# Patient Record
Sex: FEMALE | Race: WHITE | Employment: OTHER | URBAN - METROPOLITAN AREA
[De-identification: names, ages, dates, MRNs, and addresses within clinical notes are randomized per-mention and may not be internally consistent; named-entity substitution may affect disease eponyms.]

---

## 2017-05-04 ENCOUNTER — OFFICE VISIT (OUTPATIENT)
Dept: FAMILY MEDICINE CLINIC | Age: 77
End: 2017-05-04

## 2017-05-04 ENCOUNTER — TELEPHONE (OUTPATIENT)
Dept: FAMILY MEDICINE CLINIC | Age: 77
End: 2017-05-04

## 2017-05-04 VITALS
TEMPERATURE: 98.2 F | HEART RATE: 72 BPM | HEIGHT: 64 IN | SYSTOLIC BLOOD PRESSURE: 110 MMHG | BODY MASS INDEX: 20.32 KG/M2 | OXYGEN SATURATION: 98 % | DIASTOLIC BLOOD PRESSURE: 60 MMHG | RESPIRATION RATE: 16 BRPM | WEIGHT: 119 LBS

## 2017-05-04 DIAGNOSIS — E78.2 MIXED HYPERLIPIDEMIA: Primary | ICD-10-CM

## 2017-05-04 DIAGNOSIS — G47.00 INSOMNIA, UNSPECIFIED TYPE: ICD-10-CM

## 2017-05-04 PROCEDURE — 99213 OFFICE O/P EST LOW 20 MIN: CPT | Performed by: FAMILY MEDICINE

## 2017-05-04 RX ORDER — ZOLPIDEM TARTRATE 10 MG/1
10 TABLET ORAL NIGHTLY PRN
Qty: 30 TABLET | Refills: 2 | Status: SHIPPED | OUTPATIENT
Start: 2017-05-04 | End: 2017-10-02 | Stop reason: SDUPTHER

## 2017-05-04 RX ORDER — MONTELUKAST SODIUM 4 MG/1
1 TABLET, CHEWABLE ORAL DAILY
Qty: 90 TABLET | Refills: 1 | Status: SHIPPED | OUTPATIENT
Start: 2017-05-04 | End: 2017-12-05 | Stop reason: SDUPTHER

## 2017-05-04 ASSESSMENT — ENCOUNTER SYMPTOMS
GASTROINTESTINAL NEGATIVE: 1
RESPIRATORY NEGATIVE: 1

## 2017-05-09 ENCOUNTER — TELEPHONE (OUTPATIENT)
Dept: FAMILY MEDICINE CLINIC | Age: 77
End: 2017-05-09

## 2017-05-10 ENCOUNTER — TELEPHONE (OUTPATIENT)
Dept: FAMILY MEDICINE CLINIC | Age: 77
End: 2017-05-10

## 2017-05-19 ENCOUNTER — OFFICE VISIT (OUTPATIENT)
Dept: FAMILY MEDICINE CLINIC | Age: 77
End: 2017-05-19

## 2017-05-19 VITALS
HEIGHT: 64 IN | RESPIRATION RATE: 16 BRPM | OXYGEN SATURATION: 98 % | WEIGHT: 119 LBS | DIASTOLIC BLOOD PRESSURE: 60 MMHG | TEMPERATURE: 98 F | SYSTOLIC BLOOD PRESSURE: 114 MMHG | HEART RATE: 68 BPM | BODY MASS INDEX: 20.32 KG/M2

## 2017-05-19 DIAGNOSIS — Z00.00 ANNUAL PHYSICAL EXAM: Primary | ICD-10-CM

## 2017-05-19 LAB
ALBUMIN SERPL-MCNC: 4.2 G/DL (ref 3.4–5)
ALP BLD-CCNC: 84 U/L (ref 40–129)
ALT SERPL-CCNC: 15 U/L (ref 10–40)
ANION GAP SERPL CALCULATED.3IONS-SCNC: 13 MMOL/L (ref 3–16)
AST SERPL-CCNC: 19 U/L (ref 15–37)
BASOPHILS ABSOLUTE: 0 K/UL (ref 0–0.2)
BASOPHILS RELATIVE PERCENT: 0.7 %
BILIRUB SERPL-MCNC: 0.6 MG/DL (ref 0–1)
BILIRUBIN DIRECT: <0.2 MG/DL (ref 0–0.3)
BILIRUBIN, INDIRECT: NORMAL MG/DL (ref 0–1)
BILIRUBIN, POC: NORMAL
BLOOD URINE, POC: NORMAL
BUN BLDV-MCNC: 12 MG/DL (ref 7–20)
CALCIUM SERPL-MCNC: 9.2 MG/DL (ref 8.3–10.6)
CHLORIDE BLD-SCNC: 100 MMOL/L (ref 99–110)
CHOLESTEROL, TOTAL: 202 MG/DL (ref 0–199)
CLARITY, POC: CLEAR
CO2: 27 MMOL/L (ref 21–32)
COLOR, POC: YELLOW
CREAT SERPL-MCNC: 0.6 MG/DL (ref 0.6–1.2)
EOSINOPHILS ABSOLUTE: 0.1 K/UL (ref 0–0.6)
EOSINOPHILS RELATIVE PERCENT: 1.3 %
GFR AFRICAN AMERICAN: >60
GFR NON-AFRICAN AMERICAN: >60
GLUCOSE BLD-MCNC: 87 MG/DL (ref 70–99)
GLUCOSE URINE, POC: NORMAL
HCT VFR BLD CALC: 45.3 % (ref 36–48)
HDLC SERPL-MCNC: 98 MG/DL (ref 40–60)
HEMOGLOBIN: 14.4 G/DL (ref 12–16)
KETONES, POC: NORMAL
LDL CHOLESTEROL CALCULATED: 90 MG/DL
LEUKOCYTE EST, POC: NORMAL
LYMPHOCYTES ABSOLUTE: 1.7 K/UL (ref 1–5.1)
LYMPHOCYTES RELATIVE PERCENT: 32.5 %
MCH RBC QN AUTO: 31.7 PG (ref 26–34)
MCHC RBC AUTO-ENTMCNC: 31.9 G/DL (ref 31–36)
MCV RBC AUTO: 99.2 FL (ref 80–100)
MONOCYTES ABSOLUTE: 0.5 K/UL (ref 0–1.3)
MONOCYTES RELATIVE PERCENT: 10.2 %
NEUTROPHILS ABSOLUTE: 2.8 K/UL (ref 1.7–7.7)
NEUTROPHILS RELATIVE PERCENT: 55.3 %
NITRITE, POC: NORMAL
PDW BLD-RTO: 13.8 % (ref 12.4–15.4)
PH, POC: 7.5
PLATELET # BLD: 251 K/UL (ref 135–450)
PMV BLD AUTO: 8.3 FL (ref 5–10.5)
POTASSIUM SERPL-SCNC: 4.8 MMOL/L (ref 3.5–5.1)
PROTEIN, POC: NORMAL
RBC # BLD: 4.56 M/UL (ref 4–5.2)
SODIUM BLD-SCNC: 140 MMOL/L (ref 136–145)
SPECIFIC GRAVITY, POC: 1.01
TOTAL PROTEIN: 6.6 G/DL (ref 6.4–8.2)
TRIGL SERPL-MCNC: 69 MG/DL (ref 0–150)
TSH SERPL DL<=0.05 MIU/L-ACNC: 1.45 UIU/ML (ref 0.27–4.2)
UROBILINOGEN, POC: 0.2
VLDLC SERPL CALC-MCNC: 14 MG/DL
WBC # BLD: 5.1 K/UL (ref 4–11)

## 2017-05-19 PROCEDURE — 81002 URINALYSIS NONAUTO W/O SCOPE: CPT | Performed by: FAMILY MEDICINE

## 2017-05-19 PROCEDURE — 99397 PER PM REEVAL EST PAT 65+ YR: CPT | Performed by: FAMILY MEDICINE

## 2017-05-19 PROCEDURE — 93000 ELECTROCARDIOGRAM COMPLETE: CPT | Performed by: FAMILY MEDICINE

## 2017-05-19 PROCEDURE — 36415 COLL VENOUS BLD VENIPUNCTURE: CPT | Performed by: FAMILY MEDICINE

## 2017-05-19 ASSESSMENT — PATIENT HEALTH QUESTIONNAIRE - PHQ9
2. FEELING DOWN, DEPRESSED OR HOPELESS: 0
SUM OF ALL RESPONSES TO PHQ9 QUESTIONS 1 & 2: 0
1. LITTLE INTEREST OR PLEASURE IN DOING THINGS: 0
SUM OF ALL RESPONSES TO PHQ QUESTIONS 1-9: 0

## 2017-06-12 ENCOUNTER — OFFICE VISIT (OUTPATIENT)
Dept: FAMILY MEDICINE CLINIC | Age: 77
End: 2017-06-12

## 2017-06-12 VITALS
TEMPERATURE: 98.2 F | RESPIRATION RATE: 16 BRPM | SYSTOLIC BLOOD PRESSURE: 122 MMHG | DIASTOLIC BLOOD PRESSURE: 64 MMHG | WEIGHT: 121 LBS | OXYGEN SATURATION: 98 % | HEIGHT: 64 IN | HEART RATE: 76 BPM | BODY MASS INDEX: 20.66 KG/M2

## 2017-06-12 DIAGNOSIS — H69.82 EUSTACHIAN TUBE DYSFUNCTION, LEFT: Primary | ICD-10-CM

## 2017-06-12 PROCEDURE — 99213 OFFICE O/P EST LOW 20 MIN: CPT | Performed by: FAMILY MEDICINE

## 2017-06-22 ENCOUNTER — HOSPITAL ENCOUNTER (OUTPATIENT)
Dept: MAMMOGRAPHY | Age: 77
Discharge: HOME | End: 2017-06-22
Attending: OBSTETRICS & GYNECOLOGY | Admitting: OBSTETRICS & GYNECOLOGY

## 2017-06-22 DIAGNOSIS — Z12.31 VISIT FOR SCREENING MAMMOGRAM: ICD-10-CM

## 2017-08-08 ENCOUNTER — TELEPHONE (OUTPATIENT)
Dept: FAMILY MEDICINE CLINIC | Age: 77
End: 2017-08-08

## 2017-08-08 DIAGNOSIS — M85.9 DISORDER OF BONE DENSITY AND STRUCTURE, UNSPECIFIED: ICD-10-CM

## 2017-08-08 DIAGNOSIS — M85.80 OSTEOPENIA, UNSPECIFIED LOCATION: ICD-10-CM

## 2017-08-14 ENCOUNTER — HOSPITAL ENCOUNTER (OUTPATIENT)
Dept: MAMMOGRAPHY | Age: 77
Discharge: HOME | End: 2017-08-14
Attending: FAMILY MEDICINE | Admitting: FAMILY MEDICINE

## 2017-08-14 DIAGNOSIS — M85.9 DISORDER OF BONE DENSITY AND STRUCTURE, UNSPECIFIED: ICD-10-CM

## 2017-08-14 PROBLEM — M81.0 AGE-RELATED OSTEOPOROSIS WITHOUT CURRENT PATHOLOGICAL FRACTURE: Status: ACTIVE | Noted: 2017-08-14

## 2017-08-14 RX ORDER — ALENDRONATE SODIUM 70 MG/1
70 TABLET ORAL
Qty: 4 TABLET | Refills: 11 | Status: SHIPPED | OUTPATIENT
Start: 2017-08-14 | End: 2017-09-11 | Stop reason: ALTCHOICE

## 2017-09-11 ENCOUNTER — OFFICE VISIT (OUTPATIENT)
Dept: FAMILY MEDICINE CLINIC | Age: 77
End: 2017-09-11

## 2017-09-11 VITALS
HEIGHT: 63 IN | OXYGEN SATURATION: 98 % | BODY MASS INDEX: 21.44 KG/M2 | SYSTOLIC BLOOD PRESSURE: 104 MMHG | WEIGHT: 121 LBS | HEART RATE: 76 BPM | DIASTOLIC BLOOD PRESSURE: 60 MMHG

## 2017-09-11 DIAGNOSIS — F51.01 PRIMARY INSOMNIA: ICD-10-CM

## 2017-09-11 DIAGNOSIS — M81.0 AGE-RELATED OSTEOPOROSIS WITHOUT CURRENT PATHOLOGICAL FRACTURE: Primary | ICD-10-CM

## 2017-09-11 DIAGNOSIS — K52.9 CHRONIC DIARRHEA: ICD-10-CM

## 2017-09-11 PROCEDURE — 99214 OFFICE O/P EST MOD 30 MIN: CPT | Performed by: FAMILY MEDICINE

## 2017-09-11 RX ORDER — TRAZODONE HYDROCHLORIDE 100 MG/1
100 TABLET ORAL NIGHTLY
Qty: 30 TABLET | Refills: 1 | Status: SHIPPED | OUTPATIENT
Start: 2017-09-11 | End: 2017-10-02 | Stop reason: SINTOL

## 2017-09-11 ASSESSMENT — ENCOUNTER SYMPTOMS: DIARRHEA: 1

## 2017-09-12 ENCOUNTER — HOSPITAL ENCOUNTER (OUTPATIENT)
Dept: ONCOLOGY | Age: 77
Discharge: HOME | End: 2017-09-12
Attending: OBSTETRICS & GYNECOLOGY | Admitting: OBSTETRICS & GYNECOLOGY

## 2017-09-12 VITALS
BODY MASS INDEX: 21.44 KG/M2 | WEIGHT: 121 LBS | SYSTOLIC BLOOD PRESSURE: 120 MMHG | HEIGHT: 63 IN | TEMPERATURE: 98 F | HEART RATE: 76 BPM | DIASTOLIC BLOOD PRESSURE: 70 MMHG | RESPIRATION RATE: 18 BRPM

## 2017-09-12 ASSESSMENT — PAIN - FUNCTIONAL ASSESSMENT: PAIN_FUNCTIONAL_ASSESSMENT: 0-10

## 2017-09-13 ENCOUNTER — TELEPHONE (OUTPATIENT)
Dept: FAMILY MEDICINE CLINIC | Age: 77
End: 2017-09-13

## 2017-10-02 ENCOUNTER — OFFICE VISIT (OUTPATIENT)
Dept: FAMILY MEDICINE CLINIC | Age: 77
End: 2017-10-02

## 2017-10-02 VITALS
SYSTOLIC BLOOD PRESSURE: 92 MMHG | DIASTOLIC BLOOD PRESSURE: 68 MMHG | TEMPERATURE: 97.7 F | WEIGHT: 121.2 LBS | HEART RATE: 82 BPM | HEIGHT: 63 IN | BODY MASS INDEX: 21.48 KG/M2

## 2017-10-02 DIAGNOSIS — F51.01 PRIMARY INSOMNIA: Primary | ICD-10-CM

## 2017-10-02 PROCEDURE — 99213 OFFICE O/P EST LOW 20 MIN: CPT | Performed by: FAMILY MEDICINE

## 2017-10-02 RX ORDER — ZOLPIDEM TARTRATE 10 MG/1
5 TABLET ORAL NIGHTLY PRN
Qty: 30 TABLET | Refills: 0 | Status: SHIPPED | OUTPATIENT
Start: 2017-10-02 | End: 2018-04-17 | Stop reason: SDUPTHER

## 2017-10-02 NOTE — PROGRESS NOTES
Ankit Ambriz is a 78 y.o. female    Chief Complaint   Patient presents with    Insomnia       HPI:    HPI    Insomnia. Chronic issue. She has more trouble staying asleep. The Ambien has helped. Without the Ambien, she sleeps about 5 hours. With the Ambien, she sleeps about 6 hours per sleep appears much better. She is here for a f/u. She tried the Trazodone but it made very dizzy and she passed out. She went to the ER and had a normal workup including normal EKG, chest x-ray and CMP/CBC. No more further episodes. She typically takes about 5 of the Ambien per month. She is one of [place] needs a 6 month refill. ROS:    Review of Systems   Neurological: Negative for dizziness. Psychiatric/Behavioral: The patient has insomnia. BP 92/68 (Site: Left Arm, Position: Sitting, Cuff Size: Large Adult)  Pulse 82  Temp 97.7 °F (36.5 °C) (Oral)   Ht 5' 3\" (1.6 m)  Wt 121 lb 3.2 oz (55 kg)  Breastfeeding? No  BMI 21.47 kg/m2    Physical Exam:    Physical Exam   Constitutional: No distress. Skin: She is not diaphoretic. Psychiatric: She has a normal mood and affect. Current Outpatient Prescriptions   Medication Sig Dispense Refill    zolpidem (AMBIEN) 10 MG tablet Take 0.5 tablets by mouth nightly as needed (prn) 30 tablet 0    colestipol (COLESTID) 1 G tablet Take 1 tablet by mouth daily 90 tablet 1    clotrimazole (LOTRIMIN) 1 % cream Apply topically      BESIVANCE 0.6 % SUSP INSTILL 1 DROP INTO LEFT EYE THREE TIMES A DAY START 3 DAYS PRIOR TO SURGERY  0    loperamide (IMODIUM) 2 MG capsule Take 2 mg by mouth 4 times daily as needed for Diarrhea      PREMARIN vaginal cream        No current facility-administered medications for this visit. Assessment:    1. Primary insomnia        Plan:     1. Primary insomnia  Contract signed today. 30 Ambien tablets will last her 6 months.   - zolpidem (AMBIEN) 10 MG tablet;  Take 0.5 tablets by mouth nightly as needed (prn)  Dispense: 30 tablet;

## 2017-10-02 NOTE — LETTER
assessments recommended by my provider. · I will actively participate in any program designed to improve function, including social, physical, psychological and daily or work activities. 2. I will not use illegal or street drugs or another person's prescription. If I have an addiction problem with drugs or alcohol and my provider asks me to enter a program to address this issue, I agree to follow through. Such programs may include:  · 12-Step program and securing a sponsor  · Individual counseling   · Inpatient or outpatient treatment  · Other:_____________________________________________________________________________________________________________________________________________    If in treatment, I will request that a copy of the programs initial evaluation and treatment recommendations be sent to this provider and will not expect refills until that is received. I will also request written monthly updates be sent to this provider to verify my continuing treatment. 3. I will consent to drug screening upon my providers request to assure I am only taking the prescribed drugs, described in this MEDICATION AGREEMENT. I understand that a drug screen is a laboratory test in which a sample of my urine, blood or saliva is checked to see what drugs I have been taking. 4. I agree that I will treat the providers and staff at this office with respect at all times. I will keep all of my scheduled appointments, but if I need to cancel my appointment, I will do so a minimum of 24 hours before it is scheduled. 5. I understand that this provider may stop prescribing the medications listed if:  · I do not show any improvement in pain, or my activity has not improved. · I develop rapid tolerance or loss of improvement, as described in my treatment plan. · I develop significant side effects from the medication.   · My behavior is inconsistent with the responsibilities outlined above,

## 2017-10-02 NOTE — MR AVS SNAPSHOT
After Visit Summary             Hannah Carmona   10/2/2017 9:30 AM   Office Visit    Description:  Female : 1940   Provider:  Bina Mckeon DO   Department:  [address] [place] [address] and Future Appointments         Below is a list of your follow-up and future appointments. This may not be a complete list as you may have made appointments directly with providers that we are not aware of or your providers may have made some for you. Please call your providers to confirm appointments. It is important to keep your appointments. Please bring your current insurance card, photo ID, co-pay, and all medication bottles to your appointment. If self-pay, payment is expected at the time of service. Your To-Do List     Future Appointments Provider Department Dept Phone    2018 9:30 AM Bina Mckeon DO [facility] [phone]    Please arrive 15 minutes prior to appointment, bring photo ID and insurance card. Follow-Up    Return in about 6 months (around 2018) for Insomnia. Information from Your Visit        Department     Name Address Phone Fax    [address]      You Were Seen for:         Comments    Primary insomnia   [[id number]]         Vital Signs     Blood Pressure Pulse Temperature Height Weight Breastfeeding? 92/68 (Site: Left Arm, Position: Sitting, Cuff Size: Large Adult) 82 97.7 °F (36.5 °C) (Oral) 5' 3\" (1.6 m) 121 lb 3.2 oz (55 kg) No    Body Mass Index Smoking Status                21.47 kg/m2 Never Smoker             Today's Medication Changes          These changes are accurate as of: 10/2/17 10:09 AM.  If you have any questions, ask your nurse or doctor.                CHANGE how you take these medications           zolpidem 10 MG tablet   Commonly known as:  AMBIEN Problem List as of 10/2/2017  Date Reviewed: 8/8/2017                Age-related osteoporosis without current pathological fracture    Insomnia    HLD (hyperlipidemia)      Your Goals as of 10/2/2017 at 10:09 AM                 Weight    Weight < 200 lb (90.719 kg)     Notes    I will increase my physical activity level. Immunizations as of 10/2/2017     Name Date    Influenza Vaccine, unspecified formulation 10/2/2015    Influenza Virus Vaccine 10/11/2013    Influenza, High Dose 9/21/2017, 10/14/2014    Pneumococcal 13-valent Conjugate (Alexandra Linda) 4/17/2015    Tdap (Boostrix, Adacel) 9/15/2014      Preventive Care        Date Due    Pneumococcal Vaccines (two) for all adults aged 72 and over (2 of 2 - PPSV23) 4/17/2016    Cholesterol Screening 5/19/2022    Colonoscopy 5/20/2024    Tetanus Combination Vaccine (2 - Td) 9/15/2024            [employer] Signup           Our records indicate that you have an active [employer] account. You can view your After Visit Summary by going to [email or web address] and logging in with your [employer] username and password. If you don't have a [employer] username and password but a parent or guardian has access to your record, the parent or guardian should login with their own [employer] username and password and access your record to view the After Visit Summary. Additional Information  If you have questions, please contact the physician practice where you receive care. Remember, [employer] is NOT to be used for urgent needs. For medical emergencies, dial 911. For questions regarding your [employer] account call [phone]. If you have a clinical question, please call your doctor's office.

## 2017-10-05 LAB
6-ACETYLMORPHINE: NOT DETECTED
7-AMINOCLONAZEPAM: NOT DETECTED
ALPHA-OH-ALPRAZOLAM: NOT DETECTED
ALPRAZOLAM: NOT DETECTED
AMPHETAMINE: NOT DETECTED
BARBITURATES: NOT DETECTED
BENZOYLECGONINE: NOT DETECTED
BUPRENORPHINE: NOT DETECTED
CARISOPRODOL: NOT DETECTED
CLONAZEPAM: NOT DETECTED
CODEINE: NOT DETECTED
CREATININE URINE: 181.9 MG/DL (ref 20–400)
DIAZEPAM: NOT DETECTED
DRUGS EXPECTED: NORMAL
EER PAIN MGT DRUG PANEL, HIGH RES/EMIT U: NORMAL
ETHYL GLUCURONIDE: PRESENT
FENTANYL: NOT DETECTED
HYDROCODONE: NOT DETECTED
HYDROMORPHONE: NOT DETECTED
LORAZEPAM: NOT DETECTED
MARIJUANA METABOLITE: NOT DETECTED
MDA: NOT DETECTED
MDEA: NOT DETECTED
MDMA URINE: NOT DETECTED
MEPERIDINE: NOT DETECTED
METHADONE: NOT DETECTED
METHAMPHETAMINE: NOT DETECTED
METHYLPHENIDATE: NOT DETECTED
MIDAZOLAM: NOT DETECTED
MORPHINE: NOT DETECTED
NORBUPRENORPHINE, FREE: NOT DETECTED
NORDIAZEPAM: NOT DETECTED
NORFENTANYL: NOT DETECTED
NORHYDROCODONE, URINE: NOT DETECTED
NOROXYCODONE: NOT DETECTED
NOROXYMORPHONE, URINE: NOT DETECTED
OXAZEPAM: NOT DETECTED
OXYCODONE: NOT DETECTED
OXYMORPHONE: NOT DETECTED
PAIN MANAGEMENT DRUG PANEL: NORMAL
PAIN MANAGEMENT DRUG PANEL: NORMAL
PCP: NOT DETECTED
PHENTERMINE: NOT DETECTED
PROPOXYPHENE: NOT DETECTED
TAPENTADOL, URINE: NOT DETECTED
TAPENTADOL-O-SULFATE, URINE: NOT DETECTED
TEMAZEPAM: NOT DETECTED
TRAMADOL: NOT DETECTED
ZOLPIDEM: NOT DETECTED

## 2017-12-05 DIAGNOSIS — E78.2 MIXED HYPERLIPIDEMIA: ICD-10-CM

## 2017-12-05 RX ORDER — MONTELUKAST SODIUM 4 MG/1
1 TABLET, CHEWABLE ORAL DAILY
Qty: 90 TABLET | Refills: 1 | Status: SHIPPED | OUTPATIENT
Start: 2017-12-05 | End: 2018-07-24 | Stop reason: SDUPTHER

## 2018-04-17 ENCOUNTER — OFFICE VISIT (OUTPATIENT)
Dept: INTERNAL MEDICINE CLINIC | Age: 78
End: 2018-04-17

## 2018-04-17 VITALS
HEART RATE: 86 BPM | BODY MASS INDEX: 20.38 KG/M2 | DIASTOLIC BLOOD PRESSURE: 69 MMHG | WEIGHT: 115 LBS | SYSTOLIC BLOOD PRESSURE: 133 MMHG | OXYGEN SATURATION: 99 % | TEMPERATURE: 97.9 F | HEIGHT: 63 IN | RESPIRATION RATE: 14 BRPM

## 2018-04-17 DIAGNOSIS — Z00.00 MEDICARE ANNUAL WELLNESS VISIT, INITIAL: Primary | ICD-10-CM

## 2018-04-17 DIAGNOSIS — E78.00 HYPERCHOLESTEROLEMIA: ICD-10-CM

## 2018-04-17 DIAGNOSIS — Z23 NEED FOR PROPHYLACTIC VACCINATION AND INOCULATION AGAINST VARICELLA: ICD-10-CM

## 2018-04-17 DIAGNOSIS — F51.01 PRIMARY INSOMNIA: ICD-10-CM

## 2018-04-17 DIAGNOSIS — M81.0 AGE-RELATED OSTEOPOROSIS WITHOUT CURRENT PATHOLOGICAL FRACTURE: ICD-10-CM

## 2018-04-17 DIAGNOSIS — Z98.890 DIARRHEA FOLLOWING GASTROINTESTINAL SURGERY: ICD-10-CM

## 2018-04-17 DIAGNOSIS — R19.7 DIARRHEA FOLLOWING GASTROINTESTINAL SURGERY: ICD-10-CM

## 2018-04-17 PROCEDURE — G0438 PPPS, INITIAL VISIT: HCPCS | Performed by: INTERNAL MEDICINE

## 2018-04-17 PROCEDURE — 1123F ACP DISCUSS/DSCN MKR DOCD: CPT | Performed by: INTERNAL MEDICINE

## 2018-04-17 PROCEDURE — 99215 OFFICE O/P EST HI 40 MIN: CPT | Performed by: INTERNAL MEDICINE

## 2018-04-17 PROCEDURE — 4040F PNEUMOC VAC/ADMIN/RCVD: CPT | Performed by: INTERNAL MEDICINE

## 2018-04-17 PROCEDURE — 1036F TOBACCO NON-USER: CPT | Performed by: INTERNAL MEDICINE

## 2018-04-17 PROCEDURE — 1090F PRES/ABSN URINE INCON ASSESS: CPT | Performed by: INTERNAL MEDICINE

## 2018-04-17 PROCEDURE — 3017F COLORECTAL CA SCREEN DOC REV: CPT | Performed by: INTERNAL MEDICINE

## 2018-04-17 PROCEDURE — G8427 DOCREV CUR MEDS BY ELIG CLIN: HCPCS | Performed by: INTERNAL MEDICINE

## 2018-04-17 PROCEDURE — G8399 PT W/DXA RESULTS DOCUMENT: HCPCS | Performed by: INTERNAL MEDICINE

## 2018-04-17 PROCEDURE — G8420 CALC BMI NORM PARAMETERS: HCPCS | Performed by: INTERNAL MEDICINE

## 2018-04-17 RX ORDER — CALCIUM CARBONATE 500(1250)
600 TABLET ORAL DAILY
COMMUNITY

## 2018-04-17 RX ORDER — ZOLPIDEM TARTRATE 10 MG/1
5 TABLET ORAL NIGHTLY PRN
Qty: 30 TABLET | Refills: 0 | Status: SHIPPED | OUTPATIENT
Start: 2018-04-17 | End: 2018-10-02 | Stop reason: SDUPTHER

## 2018-04-17 ASSESSMENT — LIFESTYLE VARIABLES
HOW OFTEN DO YOU HAVE A DRINK CONTAINING ALCOHOL: 4
HOW OFTEN DURING THE LAST YEAR HAVE YOU FOUND THAT YOU WERE NOT ABLE TO STOP DRINKING ONCE YOU HAD STARTED: 0
AUDIT TOTAL SCORE: 4
HOW OFTEN DURING THE LAST YEAR HAVE YOU NEEDED AN ALCOHOLIC DRINK FIRST THING IN THE MORNING TO GET YOURSELF GOING AFTER A NIGHT OF HEAVY DRINKING: 0
HAVE YOU OR SOMEONE ELSE BEEN INJURED AS A RESULT OF YOUR DRINKING: 0
HOW OFTEN DURING THE LAST YEAR HAVE YOU BEEN UNABLE TO REMEMBER WHAT HAPPENED THE NIGHT BEFORE BECAUSE YOU HAD BEEN DRINKING: 0
HOW OFTEN DURING THE LAST YEAR HAVE YOU HAD A FEELING OF GUILT OR REMORSE AFTER DRINKING: 0
HOW OFTEN DO YOU HAVE SIX OR MORE DRINKS ON ONE OCCASION: 0
HOW OFTEN DURING THE LAST YEAR HAVE YOU FAILED TO DO WHAT WAS NORMALLY EXPECTED FROM YOU BECAUSE OF DRINKING: 0
HAS A RELATIVE, FRIEND, DOCTOR, OR ANOTHER HEALTH PROFESSIONAL EXPRESSED CONCERN ABOUT YOUR DRINKING OR SUGGESTED YOU CUT DOWN: 0
HOW MANY STANDARD DRINKS CONTAINING ALCOHOL DO YOU HAVE ON A TYPICAL DAY: 0
AUDIT-C TOTAL SCORE: 4

## 2018-04-17 ASSESSMENT — PATIENT HEALTH QUESTIONNAIRE - PHQ9: SUM OF ALL RESPONSES TO PHQ QUESTIONS 1-9: 2

## 2018-04-17 ASSESSMENT — ANXIETY QUESTIONNAIRES: GAD7 TOTAL SCORE: 1

## 2018-04-19 ENCOUNTER — HOSPITAL ENCOUNTER (OUTPATIENT)
Dept: GENERAL RADIOLOGY | Age: 78
Discharge: HOME | End: 2018-04-19
Attending: INTERNAL MEDICINE | Admitting: INTERNAL MEDICINE

## 2018-04-19 DIAGNOSIS — Z98.890 DIARRHEA FOLLOWING GASTROINTESTINAL SURGERY: ICD-10-CM

## 2018-04-19 DIAGNOSIS — R19.7 DIARRHEA FOLLOWING GASTROINTESTINAL SURGERY: ICD-10-CM

## 2018-04-19 DIAGNOSIS — M81.0 AGE-RELATED OSTEOPOROSIS WITHOUT CURRENT PATHOLOGICAL FRACTURE: ICD-10-CM

## 2018-04-19 DIAGNOSIS — E78.00 HYPERCHOLESTEROLEMIA: ICD-10-CM

## 2018-04-19 LAB
A/G RATIO: 1.4 (ref 1.1–2.2)
ALBUMIN SERPL-MCNC: 4.2 G/DL (ref 3.4–5)
ALP BLD-CCNC: 66 U/L (ref 40–129)
ALT SERPL-CCNC: 11 U/L (ref 10–40)
ANION GAP SERPL CALCULATED.3IONS-SCNC: 16 MMOL/L (ref 3–16)
AST SERPL-CCNC: 17 U/L (ref 15–37)
BASOPHILS ABSOLUTE: 0 K/UL (ref 0–0.2)
BASOPHILS RELATIVE PERCENT: 0.8 %
BILIRUB SERPL-MCNC: 0.7 MG/DL (ref 0–1)
BUN BLDV-MCNC: 10 MG/DL (ref 7–20)
CALCIUM SERPL-MCNC: 9.2 MG/DL (ref 8.3–10.6)
CHLORIDE BLD-SCNC: 101 MMOL/L (ref 99–110)
CHOLESTEROL, TOTAL: 190 MG/DL (ref 0–199)
CO2: 25 MMOL/L (ref 21–32)
CREAT SERPL-MCNC: 0.6 MG/DL (ref 0.6–1.2)
EOSINOPHILS ABSOLUTE: 0.1 K/UL (ref 0–0.6)
EOSINOPHILS RELATIVE PERCENT: 1.7 %
GFR AFRICAN AMERICAN: >60
GFR NON-AFRICAN AMERICAN: >60
GLOBULIN: 3.1 G/DL
GLUCOSE BLD-MCNC: 86 MG/DL (ref 70–99)
HCT VFR BLD CALC: 45.5 % (ref 36–48)
HDLC SERPL-MCNC: 89 MG/DL (ref 40–60)
HEMOGLOBIN: 15.2 G/DL (ref 12–16)
LDL CHOLESTEROL CALCULATED: 84 MG/DL
LYMPHOCYTES ABSOLUTE: 1.6 K/UL (ref 1–5.1)
LYMPHOCYTES RELATIVE PERCENT: 33.2 %
MCH RBC QN AUTO: 32.7 PG (ref 26–34)
MCHC RBC AUTO-ENTMCNC: 33.5 G/DL (ref 31–36)
MCV RBC AUTO: 97.5 FL (ref 80–100)
MONOCYTES ABSOLUTE: 0.5 K/UL (ref 0–1.3)
MONOCYTES RELATIVE PERCENT: 9.6 %
NEUTROPHILS ABSOLUTE: 2.6 K/UL (ref 1.7–7.7)
NEUTROPHILS RELATIVE PERCENT: 54.7 %
PDW BLD-RTO: 13.3 % (ref 12.4–15.4)
PLATELET # BLD: 236 K/UL (ref 135–450)
PMV BLD AUTO: 8.3 FL (ref 5–10.5)
POTASSIUM SERPL-SCNC: 4.5 MMOL/L (ref 3.5–5.1)
RBC # BLD: 4.67 M/UL (ref 4–5.2)
SODIUM BLD-SCNC: 142 MMOL/L (ref 136–145)
TOTAL PROTEIN: 7.3 G/DL (ref 6.4–8.2)
TRIGL SERPL-MCNC: 83 MG/DL (ref 0–150)
VLDLC SERPL CALC-MCNC: 17 MG/DL
WBC # BLD: 4.8 K/UL (ref 4–11)

## 2018-04-20 ENCOUNTER — HOSPITAL ENCOUNTER (OUTPATIENT)
Dept: ONCOLOGY | Age: 78
Discharge: HOME | End: 2018-04-20
Attending: OBSTETRICS & GYNECOLOGY | Admitting: OBSTETRICS & GYNECOLOGY

## 2018-04-20 VITALS
HEART RATE: 89 BPM | WEIGHT: 112 LBS | SYSTOLIC BLOOD PRESSURE: 108 MMHG | TEMPERATURE: 96 F | BODY MASS INDEX: 19.84 KG/M2 | DIASTOLIC BLOOD PRESSURE: 61 MMHG | RESPIRATION RATE: 16 BRPM | HEIGHT: 63 IN

## 2018-04-20 ASSESSMENT — PAIN - FUNCTIONAL ASSESSMENT: PAIN_FUNCTIONAL_ASSESSMENT: 0-10

## 2018-04-23 ENCOUNTER — TELEPHONE (OUTPATIENT)
Dept: INTERNAL MEDICINE CLINIC | Age: 78
End: 2018-04-23

## 2018-04-24 ENCOUNTER — TELEPHONE (OUTPATIENT)
Dept: INTERNAL MEDICINE CLINIC | Age: 78
End: 2018-04-24

## 2018-06-13 DIAGNOSIS — E78.2 MIXED HYPERLIPIDEMIA: ICD-10-CM

## 2018-06-13 RX ORDER — MONTELUKAST SODIUM 4 MG/1
1 TABLET, CHEWABLE ORAL DAILY
Qty: 90 TABLET | Refills: 1 | OUTPATIENT
Start: 2018-06-13

## 2018-06-18 ENCOUNTER — TELEPHONE (OUTPATIENT)
Dept: INTERNAL MEDICINE CLINIC | Age: 78
End: 2018-06-18

## 2018-06-27 ENCOUNTER — HOSPITAL ENCOUNTER (OUTPATIENT)
Dept: MAMMOGRAPHY | Age: 78
Discharge: HOME | End: 2018-06-27
Attending: PODIATRIST | Admitting: PODIATRIST

## 2018-06-27 DIAGNOSIS — Z12.31 VISIT FOR SCREENING MAMMOGRAM: ICD-10-CM

## 2018-06-28 ENCOUNTER — OFFICE VISIT (OUTPATIENT)
Dept: INTERNAL MEDICINE CLINIC | Age: 78
End: 2018-06-28

## 2018-06-28 VITALS
SYSTOLIC BLOOD PRESSURE: 100 MMHG | DIASTOLIC BLOOD PRESSURE: 68 MMHG | BODY MASS INDEX: 20.3 KG/M2 | HEIGHT: 63 IN | HEART RATE: 86 BPM | OXYGEN SATURATION: 93 % | WEIGHT: 114.6 LBS

## 2018-06-28 DIAGNOSIS — Z98.890 DIARRHEA FOLLOWING GASTROINTESTINAL SURGERY: Primary | ICD-10-CM

## 2018-06-28 DIAGNOSIS — R19.7 DIARRHEA FOLLOWING GASTROINTESTINAL SURGERY: Primary | ICD-10-CM

## 2018-06-28 PROCEDURE — 1123F ACP DISCUSS/DSCN MKR DOCD: CPT | Performed by: INTERNAL MEDICINE

## 2018-06-28 PROCEDURE — 99213 OFFICE O/P EST LOW 20 MIN: CPT | Performed by: INTERNAL MEDICINE

## 2018-06-28 PROCEDURE — 1090F PRES/ABSN URINE INCON ASSESS: CPT | Performed by: INTERNAL MEDICINE

## 2018-06-28 PROCEDURE — 3017F COLORECTAL CA SCREEN DOC REV: CPT | Performed by: INTERNAL MEDICINE

## 2018-06-28 PROCEDURE — 1036F TOBACCO NON-USER: CPT | Performed by: INTERNAL MEDICINE

## 2018-06-28 PROCEDURE — G8420 CALC BMI NORM PARAMETERS: HCPCS | Performed by: INTERNAL MEDICINE

## 2018-06-28 PROCEDURE — 4040F PNEUMOC VAC/ADMIN/RCVD: CPT | Performed by: INTERNAL MEDICINE

## 2018-06-28 PROCEDURE — G8399 PT W/DXA RESULTS DOCUMENT: HCPCS | Performed by: INTERNAL MEDICINE

## 2018-06-28 PROCEDURE — G8427 DOCREV CUR MEDS BY ELIG CLIN: HCPCS | Performed by: INTERNAL MEDICINE

## 2018-07-24 ENCOUNTER — OFFICE VISIT (OUTPATIENT)
Dept: INTERNAL MEDICINE CLINIC | Age: 78
End: 2018-07-24

## 2018-07-24 VITALS
OXYGEN SATURATION: 91 % | HEART RATE: 87 BPM | WEIGHT: 114.4 LBS | DIASTOLIC BLOOD PRESSURE: 76 MMHG | BODY MASS INDEX: 20.27 KG/M2 | HEIGHT: 63 IN | SYSTOLIC BLOOD PRESSURE: 124 MMHG

## 2018-07-24 DIAGNOSIS — H61.23 BILATERAL IMPACTED CERUMEN: ICD-10-CM

## 2018-07-24 DIAGNOSIS — F51.01 PRIMARY INSOMNIA: ICD-10-CM

## 2018-07-24 DIAGNOSIS — R19.7 DIARRHEA FOLLOWING GASTROINTESTINAL SURGERY: Primary | ICD-10-CM

## 2018-07-24 DIAGNOSIS — Z98.890 DIARRHEA FOLLOWING GASTROINTESTINAL SURGERY: Primary | ICD-10-CM

## 2018-07-24 PROBLEM — E78.00 HYPERCHOLESTEROLEMIA: Status: RESOLVED | Noted: 2018-04-17 | Resolved: 2018-07-24

## 2018-07-24 PROCEDURE — 3017F COLORECTAL CA SCREEN DOC REV: CPT | Performed by: INTERNAL MEDICINE

## 2018-07-24 PROCEDURE — 1123F ACP DISCUSS/DSCN MKR DOCD: CPT | Performed by: INTERNAL MEDICINE

## 2018-07-24 PROCEDURE — 1101F PT FALLS ASSESS-DOCD LE1/YR: CPT | Performed by: INTERNAL MEDICINE

## 2018-07-24 PROCEDURE — G8420 CALC BMI NORM PARAMETERS: HCPCS | Performed by: INTERNAL MEDICINE

## 2018-07-24 PROCEDURE — G8399 PT W/DXA RESULTS DOCUMENT: HCPCS | Performed by: INTERNAL MEDICINE

## 2018-07-24 PROCEDURE — 4040F PNEUMOC VAC/ADMIN/RCVD: CPT | Performed by: INTERNAL MEDICINE

## 2018-07-24 PROCEDURE — G8427 DOCREV CUR MEDS BY ELIG CLIN: HCPCS | Performed by: INTERNAL MEDICINE

## 2018-07-24 PROCEDURE — 1036F TOBACCO NON-USER: CPT | Performed by: INTERNAL MEDICINE

## 2018-07-24 PROCEDURE — 1090F PRES/ABSN URINE INCON ASSESS: CPT | Performed by: INTERNAL MEDICINE

## 2018-07-24 PROCEDURE — 99214 OFFICE O/P EST MOD 30 MIN: CPT | Performed by: INTERNAL MEDICINE

## 2018-07-24 RX ORDER — MONTELUKAST SODIUM 4 MG/1
1 TABLET, CHEWABLE ORAL DAILY
Qty: 90 TABLET | Refills: 1 | Status: SHIPPED | OUTPATIENT
Start: 2018-07-24 | End: 2018-09-17 | Stop reason: SDUPTHER

## 2018-08-17 ENCOUNTER — INITIAL CONSULT (OUTPATIENT)
Dept: GASTROENTEROLOGY | Age: 78
End: 2018-08-17

## 2018-08-17 ENCOUNTER — HOSPITAL ENCOUNTER (OUTPATIENT)
Age: 78
Discharge: HOME | End: 2018-08-17
Payer: MEDICARE

## 2018-08-17 VITALS
BODY MASS INDEX: 20.02 KG/M2 | DIASTOLIC BLOOD PRESSURE: 62 MMHG | SYSTOLIC BLOOD PRESSURE: 110 MMHG | HEIGHT: 63 IN | WEIGHT: 113 LBS

## 2018-08-17 DIAGNOSIS — K58.0 IRRITABLE BOWEL SYNDROME WITH DIARRHEA: ICD-10-CM

## 2018-08-17 DIAGNOSIS — R19.7 DIARRHEA, UNSPECIFIED TYPE: ICD-10-CM

## 2018-08-17 DIAGNOSIS — R19.7 DIARRHEA, UNSPECIFIED TYPE: Primary | ICD-10-CM

## 2018-08-17 LAB
ANION GAP SERPL CALCULATED.3IONS-SCNC: 10 MMOL/L (ref 3–16)
BUN BLDV-MCNC: 12 MG/DL (ref 7–20)
CALCIUM SERPL-MCNC: 9.8 MG/DL (ref 8.3–10.6)
CHLORIDE BLD-SCNC: 103 MMOL/L (ref 99–110)
CO2: 28 MMOL/L (ref 21–32)
CREAT SERPL-MCNC: 0.6 MG/DL (ref 0.6–1.2)
GFR AFRICAN AMERICAN: >60
GFR NON-AFRICAN AMERICAN: >60
GLUCOSE BLD-MCNC: 100 MG/DL (ref 70–99)
HCT VFR BLD CALC: 46.8 % (ref 36–48)
HEMOGLOBIN: 15.6 G/DL (ref 12–16)
IGA: 300 MG/DL (ref 70–400)
MCH RBC QN AUTO: 31.9 PG (ref 26–34)
MCHC RBC AUTO-ENTMCNC: 33.4 G/DL (ref 31–36)
MCV RBC AUTO: 95.7 FL (ref 80–100)
PDW BLD-RTO: 13.1 % (ref 12.4–15.4)
PLATELET # BLD: 304 K/UL (ref 135–450)
PMV BLD AUTO: 7.8 FL (ref 5–10.5)
POTASSIUM SERPL-SCNC: 5.4 MMOL/L (ref 3.5–5.1)
RBC # BLD: 4.89 M/UL (ref 4–5.2)
SODIUM BLD-SCNC: 141 MMOL/L (ref 136–145)
TSH REFLEX: 1.41 UIU/ML (ref 0.27–4.2)
WBC # BLD: 6 K/UL (ref 4–11)

## 2018-08-17 PROCEDURE — G8427 DOCREV CUR MEDS BY ELIG CLIN: HCPCS | Performed by: INTERNAL MEDICINE

## 2018-08-17 PROCEDURE — 99203 OFFICE O/P NEW LOW 30 MIN: CPT | Performed by: INTERNAL MEDICINE

## 2018-08-17 PROCEDURE — 36415 COLL VENOUS BLD VENIPUNCTURE: CPT

## 2018-08-17 PROCEDURE — 1036F TOBACCO NON-USER: CPT | Performed by: INTERNAL MEDICINE

## 2018-08-17 PROCEDURE — 3017F COLORECTAL CA SCREEN DOC REV: CPT | Performed by: INTERNAL MEDICINE

## 2018-08-17 PROCEDURE — G8420 CALC BMI NORM PARAMETERS: HCPCS | Performed by: INTERNAL MEDICINE

## 2018-08-17 PROCEDURE — 80048 BASIC METABOLIC PNL TOTAL CA: CPT

## 2018-08-17 PROCEDURE — 1123F ACP DISCUSS/DSCN MKR DOCD: CPT | Performed by: INTERNAL MEDICINE

## 2018-08-17 PROCEDURE — 84443 ASSAY THYROID STIM HORMONE: CPT

## 2018-08-17 PROCEDURE — G8399 PT W/DXA RESULTS DOCUMENT: HCPCS | Performed by: INTERNAL MEDICINE

## 2018-08-17 PROCEDURE — 4040F PNEUMOC VAC/ADMIN/RCVD: CPT | Performed by: INTERNAL MEDICINE

## 2018-08-17 PROCEDURE — 82784 ASSAY IGA/IGD/IGG/IGM EACH: CPT

## 2018-08-17 PROCEDURE — 85027 COMPLETE CBC AUTOMATED: CPT

## 2018-08-17 PROCEDURE — 1090F PRES/ABSN URINE INCON ASSESS: CPT | Performed by: INTERNAL MEDICINE

## 2018-08-17 PROCEDURE — 83516 IMMUNOASSAY NONANTIBODY: CPT

## 2018-08-17 PROCEDURE — 1101F PT FALLS ASSESS-DOCD LE1/YR: CPT | Performed by: INTERNAL MEDICINE

## 2018-08-20 ENCOUNTER — HOSPITAL ENCOUNTER (OUTPATIENT)
Age: 78
Discharge: HOME | End: 2018-08-20
Payer: MEDICARE

## 2018-08-20 DIAGNOSIS — E87.5 HYPERKALEMIA: ICD-10-CM

## 2018-08-20 DIAGNOSIS — E87.5 HYPERKALEMIA: Primary | ICD-10-CM

## 2018-08-20 LAB
POTASSIUM SERPL-SCNC: 4.7 MMOL/L (ref 3.5–5.1)
TISSUE TRANSGLUTAMINASE IGA: 1 U/ML (ref 0–3)

## 2018-08-20 PROCEDURE — 84132 ASSAY OF SERUM POTASSIUM: CPT

## 2018-08-20 PROCEDURE — 36415 COLL VENOUS BLD VENIPUNCTURE: CPT

## 2018-08-21 ENCOUNTER — OFFICE VISIT (OUTPATIENT)
Dept: INTERNAL MEDICINE CLINIC | Age: 78
End: 2018-08-21

## 2018-08-21 VITALS
HEIGHT: 63 IN | SYSTOLIC BLOOD PRESSURE: 110 MMHG | OXYGEN SATURATION: 98 % | DIASTOLIC BLOOD PRESSURE: 60 MMHG | WEIGHT: 113.2 LBS | BODY MASS INDEX: 20.06 KG/M2 | HEART RATE: 68 BPM | TEMPERATURE: 97.8 F

## 2018-08-21 DIAGNOSIS — Z98.890 DIARRHEA FOLLOWING GASTROINTESTINAL SURGERY: ICD-10-CM

## 2018-08-21 DIAGNOSIS — R19.7 DIARRHEA FOLLOWING GASTROINTESTINAL SURGERY: ICD-10-CM

## 2018-08-21 DIAGNOSIS — R53.83 OTHER FATIGUE: Primary | ICD-10-CM

## 2018-08-21 LAB
BILIRUBIN, POC: NORMAL
BLOOD URINE, POC: NORMAL
CLARITY, POC: NORMAL
COLOR, POC: NORMAL
GLUCOSE URINE, POC: NORMAL
KETONES, POC: NORMAL
LEUKOCYTE EST, POC: NORMAL
NITRITE, POC: NORMAL
PH, POC: 5
PROTEIN, POC: NORMAL
SPECIFIC GRAVITY, POC: 1.01
UROBILINOGEN, POC: NORMAL

## 2018-08-21 PROCEDURE — 3017F COLORECTAL CA SCREEN DOC REV: CPT | Performed by: INTERNAL MEDICINE

## 2018-08-21 PROCEDURE — 1090F PRES/ABSN URINE INCON ASSESS: CPT | Performed by: INTERNAL MEDICINE

## 2018-08-21 PROCEDURE — 1101F PT FALLS ASSESS-DOCD LE1/YR: CPT | Performed by: INTERNAL MEDICINE

## 2018-08-21 PROCEDURE — 4040F PNEUMOC VAC/ADMIN/RCVD: CPT | Performed by: INTERNAL MEDICINE

## 2018-08-21 PROCEDURE — 81002 URINALYSIS NONAUTO W/O SCOPE: CPT | Performed by: INTERNAL MEDICINE

## 2018-08-21 PROCEDURE — 1123F ACP DISCUSS/DSCN MKR DOCD: CPT | Performed by: INTERNAL MEDICINE

## 2018-08-21 PROCEDURE — G8399 PT W/DXA RESULTS DOCUMENT: HCPCS | Performed by: INTERNAL MEDICINE

## 2018-08-21 PROCEDURE — G8427 DOCREV CUR MEDS BY ELIG CLIN: HCPCS | Performed by: INTERNAL MEDICINE

## 2018-08-21 PROCEDURE — 99213 OFFICE O/P EST LOW 20 MIN: CPT | Performed by: INTERNAL MEDICINE

## 2018-08-21 PROCEDURE — G8420 CALC BMI NORM PARAMETERS: HCPCS | Performed by: INTERNAL MEDICINE

## 2018-08-21 PROCEDURE — 1036F TOBACCO NON-USER: CPT | Performed by: INTERNAL MEDICINE

## 2018-08-21 NOTE — PROGRESS NOTES
Jenny Horn  YOB: 1940    Date of Service:  8/21/2018    Chief Complaint:      Chief Complaint   Patient presents with    Shaking     For x 2 weeks now    Diarrhea     For x 3 weeks off and on       HPI:  Jenny Horn is a 78 y.o. She's complain of no energy and feeling shaky since she's mainly eating meat and not sure what to avoid except bread/pasta. Diarrhea s/p choley:  improved to once every 3 weeks on Colestid 1 gm bid and otc probiotic. She saw GI and told to follow up in 6 weeks after being on gluten free diet.    Primary Insomnia:  Stable on Ambien 10 mg 1/2 prn #30 last 6 months.     No results found for: Mikala Dominguez  Lab Results   Component Value Date     08/17/2018    K 4.7 08/20/2018     08/17/2018    CO2 28 08/17/2018    BUN 12 08/17/2018    CREATININE 0.6 08/17/2018    GLUCOSE 100 (H) 08/17/2018    CALCIUM 9.8 08/17/2018     Lab Results   Component Value Date    CHOL 190 04/19/2018    TRIG 83 04/19/2018    HDL 89 04/19/2018    LDLCALC 84 04/19/2018     Lab Results   Component Value Date    ALT 11 04/19/2018    AST 17 04/19/2018     Lab Results   Component Value Date    TSH 1.45 05/19/2017    TSH 0.91 09/26/2016     Lab Results   Component Value Date    WBC 6.0 08/17/2018    HGB 15.6 08/17/2018    HCT 46.8 08/17/2018    MCV 95.7 08/17/2018     08/17/2018     No results found for: INR   No results found for: PSA   No results found for: [facility]     Patient Active Problem List   Diagnosis    Primary insomnia    Age-related osteoporosis without current pathological fracture    Diarrhea following gastrointestinal surgery    Diarrhea    Irritable bowel syndrome with diarrhea       Allergies   Allergen Reactions    Penicillins Rash     Outpatient Prescriptions Marked as Taking for the 8/21/18 encounter (Office Visit) with Omar Olivia MD   Medication Sig Dispense Refill    colestipol (COLESTID) 1 g tablet Take 1 tablet by mouth daily 90 tablet 1

## 2018-08-23 LAB — URINE CULTURE, ROUTINE: NORMAL

## 2018-09-05 ENCOUNTER — TELEPHONE (OUTPATIENT)
Dept: INTERNAL MEDICINE CLINIC | Age: 78
End: 2018-09-05

## 2018-09-05 NOTE — TELEPHONE ENCOUNTER
Give it a little more time, there's not much I can do at this time. Try eating more protein to make sure her sugar is not dropping and lots of water especially if still having some diarrhea.

## 2018-09-05 NOTE — TELEPHONE ENCOUNTER
Called and informed patient Ramana Riling it a little more time, there's not much I can do at this time. Try eating more protein to make sure her sugar is not dropping and lots of water especially if still having some diarrhea. \"

## 2018-09-11 ENCOUNTER — OFFICE VISIT (OUTPATIENT)
Dept: GASTROENTEROLOGY | Age: 78
End: 2018-09-11

## 2018-09-11 VITALS
WEIGHT: 110 LBS | BODY MASS INDEX: 19.49 KG/M2 | DIASTOLIC BLOOD PRESSURE: 60 MMHG | HEIGHT: 63 IN | SYSTOLIC BLOOD PRESSURE: 124 MMHG

## 2018-09-11 DIAGNOSIS — R11.0 NAUSEA: ICD-10-CM

## 2018-09-11 DIAGNOSIS — R19.4 CHANGE IN BOWEL HABIT: ICD-10-CM

## 2018-09-11 DIAGNOSIS — R19.7 DIARRHEA, UNSPECIFIED TYPE: ICD-10-CM

## 2018-09-11 DIAGNOSIS — K58.0 IRRITABLE BOWEL SYNDROME WITH DIARRHEA: Primary | ICD-10-CM

## 2018-09-11 PROCEDURE — 1101F PT FALLS ASSESS-DOCD LE1/YR: CPT | Performed by: INTERNAL MEDICINE

## 2018-09-11 PROCEDURE — G8420 CALC BMI NORM PARAMETERS: HCPCS | Performed by: INTERNAL MEDICINE

## 2018-09-11 PROCEDURE — G8427 DOCREV CUR MEDS BY ELIG CLIN: HCPCS | Performed by: INTERNAL MEDICINE

## 2018-09-11 PROCEDURE — 1036F TOBACCO NON-USER: CPT | Performed by: INTERNAL MEDICINE

## 2018-09-11 PROCEDURE — 4040F PNEUMOC VAC/ADMIN/RCVD: CPT | Performed by: INTERNAL MEDICINE

## 2018-09-11 PROCEDURE — 1123F ACP DISCUSS/DSCN MKR DOCD: CPT | Performed by: INTERNAL MEDICINE

## 2018-09-11 PROCEDURE — 1090F PRES/ABSN URINE INCON ASSESS: CPT | Performed by: INTERNAL MEDICINE

## 2018-09-11 PROCEDURE — G8399 PT W/DXA RESULTS DOCUMENT: HCPCS | Performed by: INTERNAL MEDICINE

## 2018-09-11 PROCEDURE — 3017F COLORECTAL CA SCREEN DOC REV: CPT | Performed by: INTERNAL MEDICINE

## 2018-09-11 PROCEDURE — 99213 OFFICE O/P EST LOW 20 MIN: CPT | Performed by: INTERNAL MEDICINE

## 2018-09-11 RX ORDER — SODIUM, POTASSIUM,MAG SULFATES 17.5-3.13G
1 SOLUTION, RECONSTITUTED, ORAL ORAL ONCE
Qty: 1 BOTTLE | Refills: 0 | Status: SHIPPED | OUTPATIENT
Start: 2018-09-11 | End: 2018-09-11

## 2018-09-11 RX ORDER — ONDANSETRON 4 MG/1
4 TABLET, FILM COATED ORAL EVERY 8 HOURS PRN
Qty: 30 TABLET | Refills: 0 | Status: SHIPPED | OUTPATIENT
Start: 2018-09-11 | End: 2018-09-13

## 2018-09-11 NOTE — PROGRESS NOTES
Dose (Fluzone 65 yrs and older) 10/14/2014, 09/21/2017, 08/27/2018    Pneumococcal 13-valent Conjugate ([id number]) 04/17/2015, 09/21/2017    Pneumococcal Polysaccharide ([id number]) 08/02/2018    Tdap (Boostrix, Adacel) 09/15/2014    Zoster Subunit (Shingrix) 08/02/2018       REVIEW OF SYSTEMS     Constitutional: Negative for fever and unexpected weight change. Respiratory: Negative for cough, shortness of breath and wheezing. Cardiovascular: Negative for chest pain, palpitations and leg swelling. Gastrointestinal: see HPI for details. Musculoskeletal: Positive for arthralgias and back pain. Neurological: Negative for seizures, syncope. PHYSICAL EXAM   VITAL SIGNS: /60 (Site: Right Upper Arm, Position: Sitting)   Ht 5' 3\" (1.6 m)   Wt 110 lb (49.9 kg)   BMI 19.49 kg/m²   Wt Readings from Last 3 Encounters:   09/11/18 110 lb (49.9 kg)   08/21/18 113 lb 3.2 oz (51.3 kg)   08/17/18 113 lb (51.3 kg)     Constitutional: AAO3, No acute distress  HEENT: no pallor or icterus. Abdomen: soft, non tender, not distended, BS+        FINAL IMPRESSION     Diarrhea is better but has had some bowel habit changes and a sense of incomplete evacuation. Symptoms are vague but her last colonoscopy was in 2014 so a repeat exam is reasonable to rule out colonic neoplasia. She also has had a mild weight loss with nausea and loss of appetite so will also plan a EGD at the same time to rule out UGI pathology such as PUD or gastric neoplasia. Will schedule procedures with [facility]. Try Zofran for nausea, add Boost or Ensure daily to supplement calories. She has several non GI symptoms - such as feeling tired and no energy, feeling jittery and shaky at times. CBC, BMP and TSH were ok 8/17/18. Not sure how to explain these, will defer to PCP.     CC:  Daphne Conrad MD

## 2018-09-11 NOTE — LETTER
COLONOSCOPY PREP INSTRUCTIONS  SUPREP SPLIT DOSE  [facility] PHYSICIAN ENDOSCOPY    Your colonoscopy is scheduled on: _9/14/18  Dr. Bhakta/[facility]_____  Arrival Time: __10:00 AM___   DO NOT EAT OR DRINK (INCLUDING WATER) AFTER: ___5:00 AM- after drinking the 2nd dose of prep__  's Name_[facility] [phone]  [facility] [phone]    Keep these papers together; REVIEW ALL OF THEM AT LEAST 7 DAYS BEFORE THE PROCEDURE. Please complete all paperwork; including a current list of your medications, to avoid delays in the admission process. The following instructions must be followed in order to ensure your procedure has optimal outcomes. - KEEP YOUR APPOINTMENT. If for any reason, you are unable to keep your appointment, please notify us within 72 hours before your procedure. - You MUST have a responsible adult to drive you, who MUST remain at our facility the ENTIRE time. If not the procedure will be cancelled. You may go by taxi ONLY if you have a responsible adult with you. You may experience light headedness, dizziness etc., therefore you should have a responsible adult remain with you until the morning after your procedure. - Bring your insurance card and 's license. Call your insurance carrier to verify your benefits, and confirm that our facility is in your network, prior to the procedure date to ensure coverage. The facility name is listed as [facility] or [place] [address] and the tax ID# is [id number].  - Due to the safety and privacy of our patients, only one visitor is allowed in the recovery area after the procedure. The center will not be responsible for lost valuables so please leave them at home. - Try to avoid seeds (strawberries, karolyn, and rye) for one week prior to your procedure.   - If you have questions after beginning the prep, call between 8:30 am & 4:30pm you will speak to a nurse or medical assistant, after 4:30pm you will reach the physician on call. - Hydration (body fluid) is the most important aspect of an effective and safe prep. If you are not drinking enough fluid you may experience cramping, nausea and dizziness. - Common side effects of the prep are nausea, bloating and shaking chills. If any of these occur, take a break from the prep (30 minutes to 1 hour) and then restart. If unable to complete after that notify the Physician as your procedure may need to be cancelled. Nausea medication or an alternative prep is sometimes called in.  - Do not leave home after starting the prep. Frequent, liquid stools may begin as soon as 15 minutes after the first bottle, although it could take up to 4 hours or longer for your first bowel movement. - Even if your stools are clear and watery in appearance, TAKE ALL OF THE PREP.  - Using baby wipes and nonprescription ointments, such as Desitin, will help with the irritation caused by frequent loose stools. - Due to unforeseen schedule changes, you may be asked to move your appointment time to an earlier/later time slot. - If you are scheduled at the hospital with anesthesia you will need to discontinue all liquids even water 4 hours prior to your procedure. To insure that your prep gives you the best results for your Colonoscopy, Please follow all directions closely. 3-5 days prior to your procedure:  1. Begin a low-fiber diet (no corn, dried beans, tomatoes, nuts, seeds, lettuce). 2. No bran or bulking agents. 3. Stop taking your multivitamin or iron supplements.   4. If you currently take Aggrenox, Dipyridamole, Persantine, Fondaparinux sodium (Arixtra), Dalteparin sodium [place]), Warfarin (Coumadin), Clopidogrel (Plavix), Prasugrel (Effient), Enoxaparin, Lovenox, or Heparin, Cilostazol (Pletal), Rivoroxaban (Xarelto), Desirudin (Iprivask), Cyclopentyltrazolopyrimide (Ticagrelor or Brilinta), Cangrelor (Vaishali Benson), Dabigatran (Pradaxa), Apixaban (Eliquis), Edoxaban (Savaysa) you should have received instructions regarding if and when to discontinue the medication. If you have not, or do not clearly understand the instructions, please call the office for clarification (number listed above). 5. Drink plenty of fluid. 1 day prior to your procedure:  1. Do not eat any SOLID FOOD, beginning with breakfast drink clear liquids only, which includes: Chicken or Beef Broth, Coffee/tea (without milk or creamer) Gatorade/PowerAde (no red or purple), JeIl-O (no red or purple), All Soda (even dark cola), Sorbet/Popsicles (no red or purple) Water If you take Diabetic medications (insulin/oral medication)-reduce the amount by one half on the morning of prep. You may resume the meds once you begin eating again. You must drink 8oz of liquids every hour to avoid dehydration. 2. If you take Diabetic medications (insulin/oral medication) - reduce the amount by one half on morning of prep. You may resume the meds once you begin eating again. 3. Bowel Cleansing Prep  **7:45 pm (the day prior)- Take all 4 dulcolax tablets. **8:00 pm (the day prior) Pour one 6oz bottle of Suprep into the mixing container. Add cold water to the 16oz line and drink all of the solution. You must drink 2 more glasses of water within the next hour. Continue drinking other liquids at the rate of 8 oz an hour for the rest of the evening. You may add a powder flavoring like crystal light as long as it isn't red or purple. MORNING OF PROCEDURE    1. __5:00 AM_(time) Repeat the 8:00 pm instructions. 2. Take your Blood pressure, Heart and Seizure medication the morning of the procedure with sips of water. 3. Bring inhalers with you. 4. Do not take your Diabetic medication the morning of procedure.             Dear Patient, You will receive a call from the [facility] pre-registration department prior to your GI procedure. This will help streamline your check-in process on the day of service. During this call a skilled associate will review your demographic and insurance benefits information. The associate will answer any question pertaining to your insurance contract, such as deductible/co-insurance/ co-pay or any other financial concerns. They may offer an amount that you could pay on the day of surgery but this is not a requirement. Thank you for allowing [facility] to be part of your [address], [address] [place] [address] Is located at the intersection of [address] [place] [address] and [facility], next to [facility]. From [address]: Exit at [address] Travel on [address] past [facility] and turn right onto [address].  Then turn left into the main driveway facing the [facility], bare to the right of the driveway and look for the building to the right of the hospital.    If you need further directions, pleae call our main number at [phone]

## 2018-09-13 NOTE — PROGRESS NOTES
Obstructive Sleep Apnea (SRAVANTHI) Screening     Patient:  Patience Gross    YOB: 1940      Medical Record #:  [id number]                     Date:  9/13/2018     1. Are you a loud and/or regular snorer? []  Yes       [x] No    2. Have you been observed to gasp or stop breathing during sleep? []  Yes       [x] No    3. Do you feel tired or groggy upon awakening or do you awaken with a headache?           []  Yes       [] No    4. Are you often tired or fatigued during the wake time hours? []  Yes       [] No    5. Do you fall asleep sitting, reading, watching TV or driving? []  Yes       [] No    6. Do you often have problems with memory or concentration? []  Yes       [] No    **If patient's score is ? 3 they are considered high risk for SRAVANTHI. Notify the anesthesiologist of the high risk and document in focus note. Note:  If the patient's BMI is more than 35 kg m¯² , has neck circumference > 40 cm, and/or high blood pressure the risk is greater (© American Sleep Apnea Association, 2006).

## 2018-09-14 ENCOUNTER — HOSPITAL ENCOUNTER (OUTPATIENT)
Age: 78
Setting detail: OUTPATIENT SURGERY
Discharge: HOME | End: 2018-09-14
Attending: INTERNAL MEDICINE | Admitting: INTERNAL MEDICINE
Payer: MEDICARE

## 2018-09-14 ENCOUNTER — ANESTHESIA EVENT (OUTPATIENT)
Dept: ENDOSCOPY | Age: 78
End: 2018-09-14
Payer: MEDICARE

## 2018-09-14 ENCOUNTER — ANESTHESIA (OUTPATIENT)
Dept: ENDOSCOPY | Age: 78
End: 2018-09-14
Payer: MEDICARE

## 2018-09-14 VITALS — OXYGEN SATURATION: 100 % | SYSTOLIC BLOOD PRESSURE: 107 MMHG | DIASTOLIC BLOOD PRESSURE: 61 MMHG

## 2018-09-14 VITALS
OXYGEN SATURATION: 100 % | RESPIRATION RATE: 15 BRPM | BODY MASS INDEX: 18.78 KG/M2 | SYSTOLIC BLOOD PRESSURE: 112 MMHG | HEART RATE: 100 BPM | WEIGHT: 110 LBS | TEMPERATURE: 98.5 F | HEIGHT: 64 IN | DIASTOLIC BLOOD PRESSURE: 64 MMHG

## 2018-09-14 PROBLEM — K63.5 POLYP OF CECUM: Status: ACTIVE | Noted: 2018-09-14

## 2018-09-14 PROCEDURE — 7100000010 HC PHASE II RECOVERY - FIRST 15 MIN: Performed by: INTERNAL MEDICINE

## 2018-09-14 PROCEDURE — 3700000001 HC ADD 15 MINUTES (ANESTHESIA): Performed by: INTERNAL MEDICINE

## 2018-09-14 PROCEDURE — 43239 EGD BIOPSY SINGLE/MULTIPLE: CPT | Performed by: INTERNAL MEDICINE

## 2018-09-14 PROCEDURE — 2580000003 HC RX 258: Performed by: INTERNAL MEDICINE

## 2018-09-14 PROCEDURE — 3700000000 HC ANESTHESIA ATTENDED CARE: Performed by: INTERNAL MEDICINE

## 2018-09-14 PROCEDURE — 7100000011 HC PHASE II RECOVERY - ADDTL 15 MIN: Performed by: INTERNAL MEDICINE

## 2018-09-14 PROCEDURE — 6360000002 HC RX W HCPCS: Performed by: NURSE ANESTHETIST, CERTIFIED REGISTERED

## 2018-09-14 PROCEDURE — 2720000010 HC SURG SUPPLY STERILE: Performed by: INTERNAL MEDICINE

## 2018-09-14 PROCEDURE — 2709999900 HC NON-CHARGEABLE SUPPLY: Performed by: INTERNAL MEDICINE

## 2018-09-14 PROCEDURE — 45380 COLONOSCOPY AND BIOPSY: CPT | Performed by: INTERNAL MEDICINE

## 2018-09-14 PROCEDURE — 3609012400 HC EGD TRANSORAL BIOPSY SINGLE/MULTIPLE: Performed by: INTERNAL MEDICINE

## 2018-09-14 PROCEDURE — 3609010300 HC COLONOSCOPY W/BIOPSY SINGLE/MULTIPLE: Performed by: INTERNAL MEDICINE

## 2018-09-14 PROCEDURE — 2500000003 HC RX 250 WO HCPCS: Performed by: NURSE ANESTHETIST, CERTIFIED REGISTERED

## 2018-09-14 PROCEDURE — 88305 TISSUE EXAM BY PATHOLOGIST: CPT

## 2018-09-14 RX ORDER — MORPHINE SULFATE 4 MG/ML
1 INJECTION, SOLUTION INTRAMUSCULAR; INTRAVENOUS EVERY 5 MIN PRN
Status: DISCONTINUED | OUTPATIENT
Start: 2018-09-14 | End: 2018-09-14 | Stop reason: HOSPADM

## 2018-09-14 RX ORDER — MORPHINE SULFATE 4 MG/ML
2 INJECTION, SOLUTION INTRAMUSCULAR; INTRAVENOUS EVERY 5 MIN PRN
Status: DISCONTINUED | OUTPATIENT
Start: 2018-09-14 | End: 2018-09-14 | Stop reason: HOSPADM

## 2018-09-14 RX ORDER — DIPHENHYDRAMINE HYDROCHLORIDE 50 MG/ML
12.5 INJECTION INTRAMUSCULAR; INTRAVENOUS
Status: DISCONTINUED | OUTPATIENT
Start: 2018-09-14 | End: 2018-09-14 | Stop reason: HOSPADM

## 2018-09-14 RX ORDER — HYDRALAZINE HYDROCHLORIDE 20 MG/ML
5 INJECTION INTRAMUSCULAR; INTRAVENOUS EVERY 10 MIN PRN
Status: DISCONTINUED | OUTPATIENT
Start: 2018-09-14 | End: 2018-09-14 | Stop reason: HOSPADM

## 2018-09-14 RX ORDER — LABETALOL HYDROCHLORIDE 5 MG/ML
5 INJECTION, SOLUTION INTRAVENOUS EVERY 10 MIN PRN
Status: DISCONTINUED | OUTPATIENT
Start: 2018-09-14 | End: 2018-09-14 | Stop reason: HOSPADM

## 2018-09-14 RX ORDER — SODIUM CHLORIDE, SODIUM LACTATE, POTASSIUM CHLORIDE, CALCIUM CHLORIDE 600; 310; 30; 20 MG/100ML; MG/100ML; MG/100ML; MG/100ML
INJECTION, SOLUTION INTRAVENOUS CONTINUOUS
Status: DISCONTINUED | OUTPATIENT
Start: 2018-09-14 | End: 2018-09-14 | Stop reason: SDUPTHER

## 2018-09-14 RX ORDER — FENTANYL CITRATE 50 UG/ML
INJECTION, SOLUTION INTRAMUSCULAR; INTRAVENOUS PRN
Status: DISCONTINUED | OUTPATIENT
Start: 2018-09-14 | End: 2018-09-14 | Stop reason: SDUPTHER

## 2018-09-14 RX ORDER — ONDANSETRON 2 MG/ML
INJECTION INTRAMUSCULAR; INTRAVENOUS PRN
Status: DISCONTINUED | OUTPATIENT
Start: 2018-09-14 | End: 2018-09-14 | Stop reason: SDUPTHER

## 2018-09-14 RX ORDER — OXYCODONE HYDROCHLORIDE AND ACETAMINOPHEN 5; 325 MG/1; MG/1
1 TABLET ORAL PRN
Status: DISCONTINUED | OUTPATIENT
Start: 2018-09-14 | End: 2018-09-14 | Stop reason: HOSPADM

## 2018-09-14 RX ORDER — PROPOFOL 10 MG/ML
INJECTION, EMULSION INTRAVENOUS PRN
Status: DISCONTINUED | OUTPATIENT
Start: 2018-09-14 | End: 2018-09-14 | Stop reason: SDUPTHER

## 2018-09-14 RX ORDER — LIDOCAINE HYDROCHLORIDE 20 MG/ML
INJECTION, SOLUTION INFILTRATION; PERINEURAL PRN
Status: DISCONTINUED | OUTPATIENT
Start: 2018-09-14 | End: 2018-09-14 | Stop reason: SDUPTHER

## 2018-09-14 RX ORDER — ONDANSETRON 2 MG/ML
4 INJECTION INTRAMUSCULAR; INTRAVENOUS PRN
Status: DISCONTINUED | OUTPATIENT
Start: 2018-09-14 | End: 2018-09-14 | Stop reason: HOSPADM

## 2018-09-14 RX ORDER — GLYCOPYRROLATE 0.2 MG/ML
INJECTION INTRAMUSCULAR; INTRAVENOUS PRN
Status: DISCONTINUED | OUTPATIENT
Start: 2018-09-14 | End: 2018-09-14 | Stop reason: SDUPTHER

## 2018-09-14 RX ORDER — LABETALOL HYDROCHLORIDE 5 MG/ML
INJECTION, SOLUTION INTRAVENOUS PRN
Status: DISCONTINUED | OUTPATIENT
Start: 2018-09-14 | End: 2018-09-14 | Stop reason: SDUPTHER

## 2018-09-14 RX ORDER — SODIUM CHLORIDE, SODIUM LACTATE, POTASSIUM CHLORIDE, CALCIUM CHLORIDE 600; 310; 30; 20 MG/100ML; MG/100ML; MG/100ML; MG/100ML
INJECTION, SOLUTION INTRAVENOUS CONTINUOUS
Status: DISCONTINUED | OUTPATIENT
Start: 2018-09-14 | End: 2018-09-14 | Stop reason: HOSPADM

## 2018-09-14 RX ORDER — SODIUM CHLORIDE 9 MG/ML
INJECTION, SOLUTION INTRAVENOUS CONTINUOUS
Status: DISCONTINUED | OUTPATIENT
Start: 2018-09-14 | End: 2018-09-14

## 2018-09-14 RX ORDER — MEPERIDINE HYDROCHLORIDE 50 MG/ML
12.5 INJECTION INTRAMUSCULAR; INTRAVENOUS; SUBCUTANEOUS EVERY 5 MIN PRN
Status: DISCONTINUED | OUTPATIENT
Start: 2018-09-14 | End: 2018-09-14 | Stop reason: HOSPADM

## 2018-09-14 RX ORDER — PROMETHAZINE HYDROCHLORIDE 25 MG/ML
6.25 INJECTION, SOLUTION INTRAMUSCULAR; INTRAVENOUS
Status: DISCONTINUED | OUTPATIENT
Start: 2018-09-14 | End: 2018-09-14 | Stop reason: HOSPADM

## 2018-09-14 RX ORDER — OXYCODONE HYDROCHLORIDE AND ACETAMINOPHEN 5; 325 MG/1; MG/1
2 TABLET ORAL PRN
Status: DISCONTINUED | OUTPATIENT
Start: 2018-09-14 | End: 2018-09-14 | Stop reason: HOSPADM

## 2018-09-14 RX ADMIN — FENTANYL CITRATE 25 MCG: 50 INJECTION INTRAMUSCULAR; INTRAVENOUS at 11:00

## 2018-09-14 RX ADMIN — ONDANSETRON 4 MG: 2 INJECTION INTRAMUSCULAR; INTRAVENOUS at 11:09

## 2018-09-14 RX ADMIN — LIDOCAINE HYDROCHLORIDE 3 ML: 20 INJECTION, SOLUTION INFILTRATION; PERINEURAL at 11:02

## 2018-09-14 RX ADMIN — SODIUM CHLORIDE, POTASSIUM CHLORIDE, SODIUM LACTATE AND CALCIUM CHLORIDE: 600; 310; 30; 20 INJECTION, SOLUTION INTRAVENOUS at 10:50

## 2018-09-14 RX ADMIN — PROPOFOL 300 MG: 10 INJECTION, EMULSION INTRAVENOUS at 11:02

## 2018-09-14 RX ADMIN — GLYCOPYRROLATE 0.2 MG: 0.2 INJECTION, SOLUTION INTRAMUSCULAR; INTRAVENOUS at 10:56

## 2018-09-14 RX ADMIN — LABETALOL HYDROCHLORIDE 2.5 MG: 5 INJECTION, SOLUTION INTRAVENOUS at 11:03

## 2018-09-14 RX ADMIN — SODIUM CHLORIDE, POTASSIUM CHLORIDE, SODIUM LACTATE AND CALCIUM CHLORIDE: 600; 310; 30; 20 INJECTION, SOLUTION INTRAVENOUS at 10:54

## 2018-09-14 RX ADMIN — FENTANYL CITRATE 25 MCG: 50 INJECTION INTRAMUSCULAR; INTRAVENOUS at 10:58

## 2018-09-14 ASSESSMENT — PAIN - FUNCTIONAL ASSESSMENT: PAIN_FUNCTIONAL_ASSESSMENT: 0-10

## 2018-09-14 ASSESSMENT — PAIN SCALES - GENERAL
PAINLEVEL_OUTOF10: 0

## 2018-09-14 NOTE — ANESTHESIA PRE PROCEDURE
Department of Anesthesiology  Preprocedure Note       Name:  Nakia Echavarria   Age:  78 y.o.  :  1940                                          MRN:  [id number]         Date:  2018      Surgeon: Kenton Kline):  Nettie Denney MD    Procedure: Procedure(s):  EGD AND COLONOSCOPY WITH ANESTHESIA  EGD AND COLONOSCOPY WITH ANESTHESIA    Medications prior to admission:   Prior to Admission medications    Medication Sig Start Date End Date Taking? Authorizing Provider   Cyanocobalamin (VITAMIN B 12 PO) Take by mouth    Historical Provider, MD   bisacodyl (BISACODYL) 5 MG EC tablet Take 4 tablets of Bisacodyl for colonoscopy prep as directed. 18   Nettie Denney MD   colestipol (COLESTID) 1 g tablet Take 1 tablet by mouth daily 18   Estephania Olivia MD   Probiotic Product (PROBIOTIC-10 PO) Take by mouth    Historical Provider, MD   calcium carbonate (OSCAL) 500 MG TABS tablet Take 600 mg by mouth daily    Historical Provider, MD   vitamin D (CHOLECALCIFEROL) 1000 UNIT TABS tablet Take 1,000 Units by mouth daily    Historical Provider, MD   denosumab (PROLIA) 60 MG/ML SOLN SC injection Inject 60 mg into the skin Every 6 months    Historical Provider, MD   zolpidem (AMBIEN) 10 MG tablet Take 0.5 tablets by mouth nightly as needed (prn) for up to 180 days. . 4/17/18 10/14/18  Estephania Olivia MD   loperamide (IMODIUM) 2 MG capsule Take 2 mg by mouth 4 times daily as needed for Diarrhea    Historical Provider, MD   PREMARIN vaginal cream  14   Historical Provider, MD       Current medications:    Current Facility-Administered Medications   Medication Dose Route Frequency Provider Last Rate Last Dose    lactated ringers infusion   Intravenous Continuous Nettie Denney MD           Allergies:     Allergies   Allergen Reactions    Penicillins Rash       Problem List:    Patient Active Problem List   Diagnosis Code    Primary insomnia F51.01    Age-related osteoporosis without current

## 2018-09-14 NOTE — ANESTHESIA POSTPROCEDURE EVALUATION
Department of Anesthesiology  Postprocedure Note    Patient: Karina Mujica  MRN: [id number]  YOB: 1940  Date of evaluation: 9/14/2018  Time:  11:56 AM     Procedure Summary     Date:  09/14/18 Room / Location:  [facility] [address] ENDOSCOPY    Anesthesia Start:  1054 Anesthesia Stop:  4202    Procedures:       EGD BIOPSY (N/A )      COLONOSCOPY WITH BIOPSY (N/A ) Diagnosis:       Diarrhea, unspecified type      Nausea      (Helena Luria)    Surgeon:  Suha Vides MD Responsible Provider:  Sharmin Luis MD    Anesthesia Type:  general ASA Status:  1          Anesthesia Type: general    Marialuisa Phase I: Marialuisa Score: 10    Marialuisa Phase II: Marialuisa Score: 10    Last vitals: Reviewed and per EMR flowsheets.        Anesthesia Post Evaluation    Patient location during evaluation: PACU  Patient participation: complete - patient participated  Level of consciousness: awake and alert  Pain score: 0  Airway patency: patent  Nausea & Vomiting: no nausea and no vomiting  Complications: no  Cardiovascular status: blood pressure returned to baseline  Respiratory status: acceptable  Hydration status: stable

## 2018-09-14 NOTE — H&P
Chief Complaint   Patient presents with    Follow-up       f/u- diarrhea getting better, feeling nauseous, no energy, shaky, nothing sounds good food wise, drinking 50 oz water daily         HPI      Karina Mujica is a 78 y.o. female who presents for follow up for diarrhea. Since the last visit she has been doing the low FODMAP diet and diarrhea has improved. Only has this maybe once every 2 weeks or so. Denies any significant abdominal pain. She does report a new change where she feels a 'sense of having to have a bowel movement' but when she goes she has no stools, this has been happening on and off. Also nauseated a lot of times, lost appetite, loosing interest in food, lost about 3-4 lbs over the last 3 months. She denies vomiting.  is present with her for the visit. Several other symptoms such as feeling tired and no energy, feeling jittery and shaky at times. CBC, BMP and TSH were ok 8/17/18. K was initially 5.4 on lab but repeat was 4.7 so could have been an error.     PAST MEDICAL HISTORY   Past Medical History   No past medical history on file.      FAMILY HISTORY      Family History         Family History   Problem Relation Age of Onset    Cervical Cancer Mother      High Blood Pressure Father      Heart Disease Father      Stroke Maternal Grandmother           SOCIAL HISTORY      Social History   Social History            Social History    Marital status:        Spouse name: N/A    Number of children: N/A    Years of education: N/A          Occupational History    Not on file.             Social History Main Topics    Smoking status: Never Smoker    Smokeless tobacco: Never Used    Alcohol use 3.0 oz/week       5 Glasses of wine per week         Comment: wine 3x a week    Drug use: No    Sexual activity: Yes       Partners: Male           Other Topics Concern    Not on file          Social History Narrative    No narrative on file         SURGICAL HISTORY

## 2018-09-17 ENCOUNTER — TELEPHONE (OUTPATIENT)
Dept: INTERNAL MEDICINE CLINIC | Age: 78
End: 2018-09-17

## 2018-09-17 DIAGNOSIS — R19.7 DIARRHEA FOLLOWING GASTROINTESTINAL SURGERY: ICD-10-CM

## 2018-09-17 DIAGNOSIS — Z98.890 DIARRHEA FOLLOWING GASTROINTESTINAL SURGERY: ICD-10-CM

## 2018-09-17 RX ORDER — MONTELUKAST SODIUM 4 MG/1
1 TABLET, CHEWABLE ORAL 2 TIMES DAILY
Qty: 180 TABLET | Refills: 1 | Status: SHIPPED | OUTPATIENT
Start: 2018-09-17 | End: 2019-03-05 | Stop reason: SDUPTHER

## 2018-09-17 NOTE — TELEPHONE ENCOUNTER
Patient states 4 pills left and pharmacy refusing to refill since its too soon. However on 8/21/18 you increased her colestipol (COLESTID) 1 g tablet to twice a day.    Confirm patient should still be 2 pills a day still and if so will need new prescription for pharmacy

## 2018-10-02 ENCOUNTER — OFFICE VISIT (OUTPATIENT)
Dept: INTERNAL MEDICINE CLINIC | Age: 78
End: 2018-10-02
Payer: MEDICARE

## 2018-10-02 ENCOUNTER — HOSPITAL ENCOUNTER (OUTPATIENT)
Dept: NURSING | Age: 78
Setting detail: INFUSION SERIES
Discharge: HOME | End: 2018-10-02
Payer: MEDICARE

## 2018-10-02 VITALS
TEMPERATURE: 97.8 F | DIASTOLIC BLOOD PRESSURE: 66 MMHG | SYSTOLIC BLOOD PRESSURE: 115 MMHG | BODY MASS INDEX: 19.31 KG/M2 | HEIGHT: 63 IN | RESPIRATION RATE: 16 BRPM | HEART RATE: 78 BPM | WEIGHT: 109 LBS

## 2018-10-02 VITALS
HEART RATE: 68 BPM | DIASTOLIC BLOOD PRESSURE: 60 MMHG | HEIGHT: 63 IN | SYSTOLIC BLOOD PRESSURE: 114 MMHG | OXYGEN SATURATION: 98 % | BODY MASS INDEX: 19.31 KG/M2 | WEIGHT: 109 LBS

## 2018-10-02 DIAGNOSIS — Z98.890 DIARRHEA FOLLOWING GASTROINTESTINAL SURGERY: ICD-10-CM

## 2018-10-02 DIAGNOSIS — H61.21 IMPACTED CERUMEN OF RIGHT EAR: ICD-10-CM

## 2018-10-02 DIAGNOSIS — K58.0 IRRITABLE BOWEL SYNDROME WITH DIARRHEA: ICD-10-CM

## 2018-10-02 DIAGNOSIS — Z79.899 CONTROLLED SUBSTANCE AGREEMENT SIGNED: ICD-10-CM

## 2018-10-02 DIAGNOSIS — M81.0 AGE-RELATED OSTEOPOROSIS WITHOUT CURRENT PATHOLOGICAL FRACTURE: ICD-10-CM

## 2018-10-02 DIAGNOSIS — R19.7 DIARRHEA FOLLOWING GASTROINTESTINAL SURGERY: ICD-10-CM

## 2018-10-02 DIAGNOSIS — F51.01 PRIMARY INSOMNIA: Primary | ICD-10-CM

## 2018-10-02 PROCEDURE — G8482 FLU IMMUNIZE ORDER/ADMIN: HCPCS | Performed by: INTERNAL MEDICINE

## 2018-10-02 PROCEDURE — 1123F ACP DISCUSS/DSCN MKR DOCD: CPT | Performed by: INTERNAL MEDICINE

## 2018-10-02 PROCEDURE — 1101F PT FALLS ASSESS-DOCD LE1/YR: CPT | Performed by: INTERNAL MEDICINE

## 2018-10-02 PROCEDURE — 1036F TOBACCO NON-USER: CPT | Performed by: INTERNAL MEDICINE

## 2018-10-02 PROCEDURE — 99214 OFFICE O/P EST MOD 30 MIN: CPT | Performed by: INTERNAL MEDICINE

## 2018-10-02 PROCEDURE — G8399 PT W/DXA RESULTS DOCUMENT: HCPCS | Performed by: INTERNAL MEDICINE

## 2018-10-02 PROCEDURE — G8420 CALC BMI NORM PARAMETERS: HCPCS | Performed by: INTERNAL MEDICINE

## 2018-10-02 PROCEDURE — 6360000002 HC RX W HCPCS: Performed by: OBSTETRICS & GYNECOLOGY

## 2018-10-02 PROCEDURE — G8427 DOCREV CUR MEDS BY ELIG CLIN: HCPCS | Performed by: INTERNAL MEDICINE

## 2018-10-02 PROCEDURE — 1090F PRES/ABSN URINE INCON ASSESS: CPT | Performed by: INTERNAL MEDICINE

## 2018-10-02 PROCEDURE — 96372 THER/PROPH/DIAG INJ SC/IM: CPT

## 2018-10-02 PROCEDURE — 69209 REMOVE IMPACTED EAR WAX UNI: CPT | Performed by: INTERNAL MEDICINE

## 2018-10-02 PROCEDURE — 4040F PNEUMOC VAC/ADMIN/RCVD: CPT | Performed by: INTERNAL MEDICINE

## 2018-10-02 RX ORDER — ZOLPIDEM TARTRATE 10 MG/1
5 TABLET ORAL NIGHTLY PRN
Qty: 30 TABLET | Refills: 0 | Status: SHIPPED | OUTPATIENT
Start: 2018-10-02 | End: 2019-05-07 | Stop reason: SDUPTHER

## 2018-10-02 RX ADMIN — DENOSUMAB 60 MG: 60 INJECTION SUBCUTANEOUS at 10:58

## 2018-10-02 ASSESSMENT — PAIN SCALES - GENERAL: PAINLEVEL_OUTOF10: 0

## 2018-10-02 NOTE — PLAN OF CARE
Problem: Discharge Planning:  Goal: Patients continuum of care needs are met  Patients continuum of care needs are met  Outcome: Met This Shift

## 2019-03-05 DIAGNOSIS — Z98.890 DIARRHEA FOLLOWING GASTROINTESTINAL SURGERY: ICD-10-CM

## 2019-03-05 DIAGNOSIS — R19.7 DIARRHEA FOLLOWING GASTROINTESTINAL SURGERY: ICD-10-CM

## 2019-03-05 RX ORDER — MONTELUKAST SODIUM 4 MG/1
TABLET, CHEWABLE ORAL
Qty: 180 TABLET | Refills: 0 | Status: SHIPPED | OUTPATIENT
Start: 2019-03-05 | End: 2019-05-28 | Stop reason: SDUPTHER

## 2019-05-06 ENCOUNTER — HOSPITAL ENCOUNTER (OUTPATIENT)
Dept: NURSING | Age: 79
Setting detail: INFUSION SERIES
Discharge: HOME | End: 2019-05-06
Payer: MEDICARE

## 2019-05-06 VITALS
SYSTOLIC BLOOD PRESSURE: 121 MMHG | RESPIRATION RATE: 16 BRPM | DIASTOLIC BLOOD PRESSURE: 58 MMHG | TEMPERATURE: 96.1 F | HEIGHT: 64 IN | WEIGHT: 110 LBS | HEART RATE: 77 BPM | BODY MASS INDEX: 18.78 KG/M2

## 2019-05-06 DIAGNOSIS — M81.0 AGE-RELATED OSTEOPOROSIS WITHOUT CURRENT PATHOLOGICAL FRACTURE: Primary | ICD-10-CM

## 2019-05-06 PROCEDURE — 6360000002 HC RX W HCPCS: Performed by: OBSTETRICS & GYNECOLOGY

## 2019-05-06 PROCEDURE — 96372 THER/PROPH/DIAG INJ SC/IM: CPT

## 2019-05-06 RX ADMIN — DENOSUMAB 60 MG: 60 INJECTION SUBCUTANEOUS at 09:08

## 2019-05-06 ASSESSMENT — PAIN - FUNCTIONAL ASSESSMENT: PAIN_FUNCTIONAL_ASSESSMENT: 0-10

## 2019-05-07 ENCOUNTER — OFFICE VISIT (OUTPATIENT)
Dept: INTERNAL MEDICINE CLINIC | Age: 79
End: 2019-05-07
Payer: MEDICARE

## 2019-05-07 VITALS
HEIGHT: 63 IN | WEIGHT: 115.4 LBS | OXYGEN SATURATION: 98 % | DIASTOLIC BLOOD PRESSURE: 60 MMHG | SYSTOLIC BLOOD PRESSURE: 114 MMHG | BODY MASS INDEX: 20.45 KG/M2 | HEART RATE: 74 BPM

## 2019-05-07 DIAGNOSIS — Z98.890 DIARRHEA FOLLOWING GASTROINTESTINAL SURGERY: ICD-10-CM

## 2019-05-07 DIAGNOSIS — Z91.89 RISK FOR CORONARY ARTERY DISEASE BETWEEN 10% AND 20% IN NEXT 10 YEARS: ICD-10-CM

## 2019-05-07 DIAGNOSIS — F51.01 PRIMARY INSOMNIA: ICD-10-CM

## 2019-05-07 DIAGNOSIS — R19.7 DIARRHEA FOLLOWING GASTROINTESTINAL SURGERY: ICD-10-CM

## 2019-05-07 DIAGNOSIS — Z00.00 ROUTINE GENERAL MEDICAL EXAMINATION AT A HEALTH CARE FACILITY: Primary | ICD-10-CM

## 2019-05-07 DIAGNOSIS — E78.00 BORDERLINE HYPERCHOLESTEROLEMIA: ICD-10-CM

## 2019-05-07 DIAGNOSIS — M81.0 AGE-RELATED OSTEOPOROSIS WITHOUT CURRENT PATHOLOGICAL FRACTURE: ICD-10-CM

## 2019-05-07 DIAGNOSIS — Z79.899 CONTROLLED SUBSTANCE AGREEMENT SIGNED: ICD-10-CM

## 2019-05-07 PROBLEM — M80.051A FRACTURE OF RIGHT HIP DUE TO OSTEOPOROSIS (HCC): Status: ACTIVE | Noted: 2017-08-14

## 2019-05-07 LAB
A/G RATIO: 1.5 (ref 1.1–2.2)
ALBUMIN SERPL-MCNC: 4.5 G/DL (ref 3.4–5)
ALP BLD-CCNC: 78 U/L (ref 40–129)
ALT SERPL-CCNC: 13 U/L (ref 10–40)
ANION GAP SERPL CALCULATED.3IONS-SCNC: 12 MMOL/L (ref 3–16)
AST SERPL-CCNC: 18 U/L (ref 15–37)
BASOPHILS ABSOLUTE: 0.1 K/UL (ref 0–0.2)
BASOPHILS RELATIVE PERCENT: 1 %
BILIRUB SERPL-MCNC: 0.6 MG/DL (ref 0–1)
BUN BLDV-MCNC: 15 MG/DL (ref 7–20)
CALCIUM SERPL-MCNC: 10 MG/DL (ref 8.3–10.6)
CHLORIDE BLD-SCNC: 101 MMOL/L (ref 99–110)
CHOLESTEROL, TOTAL: 215 MG/DL (ref 0–199)
CO2: 27 MMOL/L (ref 21–32)
CREAT SERPL-MCNC: 0.6 MG/DL (ref 0.6–1.2)
EOSINOPHILS ABSOLUTE: 0.2 K/UL (ref 0–0.6)
EOSINOPHILS RELATIVE PERCENT: 2.4 %
GFR AFRICAN AMERICAN: >60
GFR NON-AFRICAN AMERICAN: >60
GLOBULIN: 3 G/DL
GLUCOSE BLD-MCNC: 94 MG/DL (ref 70–99)
HCT VFR BLD CALC: 46.2 % (ref 36–48)
HDLC SERPL-MCNC: 108 MG/DL (ref 40–60)
HEMOGLOBIN: 15.9 G/DL (ref 12–16)
LDL CHOLESTEROL CALCULATED: 90 MG/DL
LYMPHOCYTES ABSOLUTE: 2 K/UL (ref 1–5.1)
LYMPHOCYTES RELATIVE PERCENT: 28.1 %
MCH RBC QN AUTO: 33.8 PG (ref 26–34)
MCHC RBC AUTO-ENTMCNC: 34.3 G/DL (ref 31–36)
MCV RBC AUTO: 98.5 FL (ref 80–100)
MONOCYTES ABSOLUTE: 0.7 K/UL (ref 0–1.3)
MONOCYTES RELATIVE PERCENT: 9.2 %
NEUTROPHILS ABSOLUTE: 4.2 K/UL (ref 1.7–7.7)
NEUTROPHILS RELATIVE PERCENT: 59.3 %
PDW BLD-RTO: 14 % (ref 12.4–15.4)
PLATELET # BLD: 287 K/UL (ref 135–450)
PMV BLD AUTO: 7.8 FL (ref 5–10.5)
POTASSIUM SERPL-SCNC: 5.4 MMOL/L (ref 3.5–5.1)
RBC # BLD: 4.69 M/UL (ref 4–5.2)
SODIUM BLD-SCNC: 140 MMOL/L (ref 136–145)
TOTAL PROTEIN: 7.5 G/DL (ref 6.4–8.2)
TRIGL SERPL-MCNC: 85 MG/DL (ref 0–150)
VLDLC SERPL CALC-MCNC: 17 MG/DL
WBC # BLD: 7.1 K/UL (ref 4–11)

## 2019-05-07 PROCEDURE — 1090F PRES/ABSN URINE INCON ASSESS: CPT | Performed by: INTERNAL MEDICINE

## 2019-05-07 PROCEDURE — 4040F PNEUMOC VAC/ADMIN/RCVD: CPT | Performed by: INTERNAL MEDICINE

## 2019-05-07 PROCEDURE — G8427 DOCREV CUR MEDS BY ELIG CLIN: HCPCS | Performed by: INTERNAL MEDICINE

## 2019-05-07 PROCEDURE — G8420 CALC BMI NORM PARAMETERS: HCPCS | Performed by: INTERNAL MEDICINE

## 2019-05-07 PROCEDURE — G0439 PPPS, SUBSEQ VISIT: HCPCS | Performed by: INTERNAL MEDICINE

## 2019-05-07 PROCEDURE — 1123F ACP DISCUSS/DSCN MKR DOCD: CPT | Performed by: INTERNAL MEDICINE

## 2019-05-07 PROCEDURE — 36415 COLL VENOUS BLD VENIPUNCTURE: CPT | Performed by: INTERNAL MEDICINE

## 2019-05-07 PROCEDURE — 99214 OFFICE O/P EST MOD 30 MIN: CPT | Performed by: INTERNAL MEDICINE

## 2019-05-07 PROCEDURE — G8399 PT W/DXA RESULTS DOCUMENT: HCPCS | Performed by: INTERNAL MEDICINE

## 2019-05-07 PROCEDURE — 1036F TOBACCO NON-USER: CPT | Performed by: INTERNAL MEDICINE

## 2019-05-07 RX ORDER — ZOLPIDEM TARTRATE 10 MG/1
5 TABLET ORAL NIGHTLY PRN
Qty: 30 TABLET | Refills: 0 | Status: SHIPPED | OUTPATIENT
Start: 2019-05-07 | End: 2019-08-08 | Stop reason: ALTCHOICE

## 2019-05-07 ASSESSMENT — ANXIETY QUESTIONNAIRES: GAD7 TOTAL SCORE: 0

## 2019-05-07 ASSESSMENT — LIFESTYLE VARIABLES
HOW OFTEN DURING THE LAST YEAR HAVE YOU NEEDED AN ALCOHOLIC DRINK FIRST THING IN THE MORNING TO GET YOURSELF GOING AFTER A NIGHT OF HEAVY DRINKING: 0
AUDIT-C TOTAL SCORE: 3
AUDIT TOTAL SCORE: 3
HOW MANY STANDARD DRINKS CONTAINING ALCOHOL DO YOU HAVE ON A TYPICAL DAY: 0
HOW OFTEN DURING THE LAST YEAR HAVE YOU BEEN UNABLE TO REMEMBER WHAT HAPPENED THE NIGHT BEFORE BECAUSE YOU HAD BEEN DRINKING: 0
HOW OFTEN DURING THE LAST YEAR HAVE YOU HAD A FEELING OF GUILT OR REMORSE AFTER DRINKING: 0
HOW OFTEN DURING THE LAST YEAR HAVE YOU FOUND THAT YOU WERE NOT ABLE TO STOP DRINKING ONCE YOU HAD STARTED: 0
HAS A RELATIVE, FRIEND, DOCTOR, OR ANOTHER HEALTH PROFESSIONAL EXPRESSED CONCERN ABOUT YOUR DRINKING OR SUGGESTED YOU CUT DOWN: 0
HOW OFTEN DURING THE LAST YEAR HAVE YOU FAILED TO DO WHAT WAS NORMALLY EXPECTED FROM YOU BECAUSE OF DRINKING: 0
HOW OFTEN DO YOU HAVE A DRINK CONTAINING ALCOHOL: 3
HOW OFTEN DO YOU HAVE SIX OR MORE DRINKS ON ONE OCCASION: 0
HAVE YOU OR SOMEONE ELSE BEEN INJURED AS A RESULT OF YOUR DRINKING: 0

## 2019-05-07 ASSESSMENT — PATIENT HEALTH QUESTIONNAIRE - PHQ9
SUM OF ALL RESPONSES TO PHQ QUESTIONS 1-9: 0
SUM OF ALL RESPONSES TO PHQ QUESTIONS 1-9: 0

## 2019-05-07 NOTE — PATIENT INSTRUCTIONS
Personalized Preventive Plan for Mari Herbert - 5/7/2019  Medicare offers a range of preventive health benefits. Some of the tests and screenings are paid in full while other may be subject to a deductible, co-insurance, and/or copay. Some of these benefits include a comprehensive review of your medical history including lifestyle, illnesses that may run in your family, and various assessments and screenings as appropriate. After reviewing your medical record and screening and assessments performed today your provider may have ordered immunizations, labs, imaging, and/or referrals for you. A list of these orders (if applicable) as well as your Preventive Care list are included within your After Visit Summary for your review. Other Preventive Recommendations:    · A preventive eye exam performed by an eye specialist is recommended every 1-2 years to screen for glaucoma; cataracts, macular degeneration, and other eye disorders. · A preventive dental visit is recommended every 6 months. · Try to get at least 150 minutes of exercise per week or 10,000 steps per day on a pedometer . · Order or download the FREE \"Exercise & Physical Activity: Your Everyday Guide\" from [email or web address]. Call 5-264.586.9969 or search [employer] online. · You need 6598-7328 mg of calcium and 6558-1363 IU of vitamin D per day. It is possible to meet your calcium requirement with diet alone, but a vitamin D supplement is usually necessary to meet this goal.  · When exposed to the sun, use a sunscreen that protects against both UVA and UVB radiation with an SPF of 30 or greater. Reapply every 2 to 3 hours or after sweating, drying off with a towel, or swimming. · Always wear a seat belt when traveling in a car. Always wear a helmet when riding a bicycle or motorcycle.

## 2019-05-07 NOTE — PROGRESS NOTES
GASTROINTESTINAL ENDOSCOPY N/A 9/14/2018    EGD BIOPSY performed by Miguel Ayoub MD at [address]     Family History   Problem Relation Age of Onset    Cervical Cancer Mother     High Blood Pressure Father     Heart Disease Father     Stroke Maternal Grandmother        CareTeam (Including outside providers/suppliers regularly involved in providing care):   Patient Care Team:  Edna Olivia MD as PCP - General (Internal Medicine)    Wt Readings from Last 3 Encounters:   05/07/19 115 lb 6.4 oz (52.3 kg)   05/06/19 110 lb (49.9 kg)   10/02/18 109 lb (49.4 kg)     Vitals:    05/07/19 0948   BP: 114/60   Pulse: 74   SpO2: 98%   Weight: 115 lb 6.4 oz (52.3 kg)   Height: 5' 3\" (1.6 m)     Body mass index is 20.44 kg/m². Based upon direct observation of the patient, evaluation of cognition reveals recent and remote memory intact. Patient's complete Health Risk Assessment and screening values have been reviewed and are found in Flowsheets. The following problems were reviewed today and where indicated follow up appointments were made and/or referrals ordered. Positive Risk Factor Screenings with Interventions:     No Positive Risk Factors identified today.     Personalized Preventive Plan   Current Health Maintenance Status  Immunization History   Administered Date(s) Administered    Influenza Vaccine, unspecified formulation 10/02/2015    Influenza Virus Vaccine 10/11/2013    Influenza, High Dose (Fluzone 65 yrs and older) 10/14/2014, 09/21/2017, 08/27/2018    Pneumococcal 13-valent Conjugate (Haixusp56) 04/17/2015, 09/21/2017    Pneumococcal Polysaccharide (Wradbentd43) 08/02/2018    Tdap (Boostrix, Adacel) 09/15/2014    Zoster Subunit (Shingrix) 08/02/2018, 03/29/2019        Health Maintenance   Topic Date Due    Colon cancer screen colonoscopy  09/14/2023    DTaP/Tdap/Td vaccine (2 - Td) 09/15/2024    DEXA (modify frequency per FRAX score)  Completed    Flu vaccine  Completed    Shingles Vaccine  Completed    Pneumococcal 65+ years Vaccine  Completed     Recommendations for Preventive Services Due: see orders and patient instructions/AVS.  .   Recommended screening schedule for the next 5-10 years is provided to the patient in written form: see Patient Instructions/AVS.

## 2019-05-07 NOTE — PROGRESS NOTES
Jailyn Gamboa  YOB: 1940    Date of Service:  5/7/2019    Chief Complaint:      Chief Complaint   Patient presents with   [facility] Medicare AWV     patient is fasting.  Insomnia       HPI:  Jailyn Gamboa is a 70 y.o. Diarrhea s/p choley:  improved to once every 2 weeks on Colestid 1 gm bid and otc probiotic.    Primary Insomnia:  Stable on Ambien 10 mg 1/2 prn #30 last 6 months. Failed trazodone 2017 due to dizziness. Hyperlipidemia:  Patient is  following a low fat, low cholesterol diet. She is  exercising regularly. OTC Supplements: none.   Osteoporosis:  Stable on Prolia q 6 month since 2017 per GYN    The 10-year ASCVD risk score (Gio Hickman, et al., 2013) is: 14.8%    Values used to calculate the score:      Age: 70 years      Sex: Female      Is Non- : No      Diabetic: No      Tobacco smoker: No      Systolic Blood Pressure: 922 mmHg      Is BP treated: No      HDL Cholesterol: 89 mg/dL      Total Cholesterol: 190 mg/dL    No results found for: LABA1C, LABMICR  Lab Results   Component Value Date     08/17/2018    K 4.7 08/20/2018     08/17/2018    CO2 28 08/17/2018    BUN 12 08/17/2018    CREATININE 0.6 08/17/2018    GLUCOSE 100 (H) 08/17/2018    CALCIUM 9.8 08/17/2018     Lab Results   Component Value Date    CHOL 190 04/19/2018    TRIG 83 04/19/2018    HDL 89 04/19/2018    LDLCALC 84 04/19/2018     Lab Results   Component Value Date    ALT 11 04/19/2018    AST 17 04/19/2018     Lab Results   Component Value Date    TSH 1.45 05/19/2017    TSH 0.91 09/26/2016     Lab Results   Component Value Date    WBC 6.0 08/17/2018    HGB 15.6 08/17/2018    HCT 46.8 08/17/2018    MCV 95.7 08/17/2018     08/17/2018     No results found for: INR   No results found for: PSA   No results found for: [facility]     Patient Active Problem List   Diagnosis    Primary insomnia    Age-related osteoporosis without current pathological fracture    Diarrhea following gastrointestinal surgery    Diarrhea    Irritable bowel syndrome with diarrhea    Nausea    Change in bowel habit    Polyp of cecum    Loss of appetite    Controlled substance agreement signed       Allergies   Allergen Reactions    Penicillins Rash     Outpatient Medications Marked as Taking for the 5/7/19 encounter (Office Visit) with Aislinn Olivia MD   Medication Sig Dispense Refill    colestipol (COLESTID) 1 g tablet TAKE 1 TABLET BY MOUTH TWICE A  tablet 0    Probiotic Product (PROBIOTIC-10 PO) Take by mouth      calcium carbonate (OSCAL) 500 MG TABS tablet Take 600 mg by mouth daily      vitamin D (CHOLECALCIFEROL) 1000 UNIT TABS tablet Take 1,000 Units by mouth daily      denosumab (PROLIA) 60 MG/ML SOLN SC injection Inject 60 mg into the skin Every 6 months      loperamide (IMODIUM) 2 MG capsule Take 2 mg by mouth 4 times daily as needed for Diarrhea      PREMARIN vaginal cream            Review of Systems: 14 systems were negative except of what was stated on HPI    Nursing note and vitals reviewed. Vitals:    05/07/19 0948   BP: 114/60   Pulse: 74   SpO2: 98%   Weight: 115 lb 6.4 oz (52.3 kg)   Height: 5' 3\" (1.6 m)     Wt Readings from Last 3 Encounters:   05/07/19 115 lb 6.4 oz (52.3 kg)   05/06/19 110 lb (49.9 kg)   10/02/18 109 lb (49.4 kg)     BP Readings from Last 3 Encounters:   05/07/19 114/60   05/06/19 (!) 121/58   10/02/18 115/66     Body mass index is 20.44 kg/m². Constitutional: Patient appears well-developed and well-nourished. No distress. Head: Normocephalic and atraumatic. Neck: Normal range of motion. Neck supple. No thyroidmegaly. Cardiovascular: Normal rate, regular rhythm, normal heart sounds and intact distal pulses. Pulmonary/Chest: Effort normal and breath sounds normal. No stridor. No respiratory distress. No wheezes and no rales. Abdominal: Soft. Bowel sounds are normal. No distension and no mass. No tenderness. No rebound and no guarding.

## 2019-06-18 ENCOUNTER — HOSPITAL ENCOUNTER (OUTPATIENT)
Dept: GENERAL RADIOLOGY | Age: 79
Discharge: HOME | End: 2019-06-18
Payer: MEDICARE

## 2019-06-18 DIAGNOSIS — M81.0 MENOPAUSAL OSTEOPOROSIS: ICD-10-CM

## 2019-06-18 PROCEDURE — 77080 DXA BONE DENSITY AXIAL: CPT

## 2019-07-01 ENCOUNTER — HOSPITAL ENCOUNTER (OUTPATIENT)
Dept: MAMMOGRAPHY | Age: 79
Discharge: HOME | End: 2019-07-01
Payer: MEDICARE

## 2019-07-01 DIAGNOSIS — Z12.31 VISIT FOR SCREENING MAMMOGRAM: ICD-10-CM

## 2019-07-01 PROCEDURE — 77063 BREAST TOMOSYNTHESIS BI: CPT

## 2019-08-08 DIAGNOSIS — Z79.899 CONTROLLED SUBSTANCE AGREEMENT SIGNED: ICD-10-CM

## 2019-08-08 DIAGNOSIS — F51.01 PRIMARY INSOMNIA: ICD-10-CM

## 2019-08-08 RX ORDER — ZOLPIDEM TARTRATE 10 MG/1
TABLET ORAL
Qty: 30 TABLET | Refills: 0 | Status: SHIPPED | OUTPATIENT
Start: 2019-08-08 | End: 2020-05-07

## 2019-09-17 ENCOUNTER — OFFICE VISIT (OUTPATIENT)
Dept: INTERNAL MEDICINE CLINIC | Age: 79
End: 2019-09-17
Payer: MEDICARE

## 2019-09-17 ENCOUNTER — HOSPITAL ENCOUNTER (OUTPATIENT)
Age: 79
Discharge: HOME | End: 2019-09-17
Payer: MEDICARE

## 2019-09-17 ENCOUNTER — HOSPITAL ENCOUNTER (OUTPATIENT)
Dept: GENERAL RADIOLOGY | Age: 79
Discharge: HOME | End: 2019-09-17
Payer: MEDICARE

## 2019-09-17 VITALS
HEIGHT: 63 IN | BODY MASS INDEX: 20.91 KG/M2 | OXYGEN SATURATION: 98 % | WEIGHT: 118 LBS | HEART RATE: 77 BPM | DIASTOLIC BLOOD PRESSURE: 66 MMHG | SYSTOLIC BLOOD PRESSURE: 122 MMHG

## 2019-09-17 DIAGNOSIS — F51.01 PRIMARY INSOMNIA: ICD-10-CM

## 2019-09-17 DIAGNOSIS — M79.674 PAIN IN RIGHT TOE(S): ICD-10-CM

## 2019-09-17 DIAGNOSIS — M81.0 AGE-RELATED OSTEOPOROSIS WITHOUT CURRENT PATHOLOGICAL FRACTURE: ICD-10-CM

## 2019-09-17 DIAGNOSIS — M79.674 PAIN IN RIGHT TOE(S): Primary | ICD-10-CM

## 2019-09-17 PROCEDURE — 4040F PNEUMOC VAC/ADMIN/RCVD: CPT | Performed by: INTERNAL MEDICINE

## 2019-09-17 PROCEDURE — G8399 PT W/DXA RESULTS DOCUMENT: HCPCS | Performed by: INTERNAL MEDICINE

## 2019-09-17 PROCEDURE — 73660 X-RAY EXAM OF TOE(S): CPT

## 2019-09-17 PROCEDURE — G8427 DOCREV CUR MEDS BY ELIG CLIN: HCPCS | Performed by: INTERNAL MEDICINE

## 2019-09-17 PROCEDURE — 1123F ACP DISCUSS/DSCN MKR DOCD: CPT | Performed by: INTERNAL MEDICINE

## 2019-09-17 PROCEDURE — 1090F PRES/ABSN URINE INCON ASSESS: CPT | Performed by: INTERNAL MEDICINE

## 2019-09-17 PROCEDURE — G8420 CALC BMI NORM PARAMETERS: HCPCS | Performed by: INTERNAL MEDICINE

## 2019-09-17 PROCEDURE — 1036F TOBACCO NON-USER: CPT | Performed by: INTERNAL MEDICINE

## 2019-09-17 PROCEDURE — 99214 OFFICE O/P EST MOD 30 MIN: CPT | Performed by: INTERNAL MEDICINE

## 2019-09-17 NOTE — PROGRESS NOTES
PSA   No results found for: [facility]     Patient Active Problem List   Diagnosis    Primary insomnia    Age-related osteoporosis without current pathological fracture    Diarrhea following gastrointestinal surgery    Diarrhea    Irritable bowel syndrome with diarrhea    Nausea    Change in bowel habit    Polyp of cecum    Loss of appetite    Controlled substance agreement signed    Risk for coronary artery disease between 10% and 20% in next 10 years       Allergies   Allergen Reactions    Penicillins Rash     Outpatient Medications Marked as Taking for the 9/17/19 encounter (Office Visit) with David Olivia MD   Medication Sig Dispense Refill    zolpidem (AMBIEN) 10 MG tablet TAKE 0.5 TABLET BY MOUTH EVERY EVENING AS NEEDED 30 tablet 0    colestipol (COLESTID) 1 g tablet TAKE 1 TABLET BY MOUTH TWICE A  tablet 3    Probiotic Product (PROBIOTIC-10 PO) Take by mouth      calcium carbonate (OSCAL) 500 MG TABS tablet Take 600 mg by mouth daily      vitamin D (CHOLECALCIFEROL) 1000 UNIT TABS tablet Take 1,000 Units by mouth daily      denosumab (PROLIA) 60 MG/ML SOLN SC injection Inject 60 mg into the skin Every 6 months      loperamide (IMODIUM) 2 MG capsule Take 2 mg by mouth 4 times daily as needed for Diarrhea      PREMARIN vaginal cream            Review of Systems: 14 systems were negative except of what was stated on HPI    Nursing note and vitals reviewed. Vitals:    09/17/19 1007   BP: 122/66   Pulse: 77   SpO2: 98%   Weight: 118 lb (53.5 kg)   Height: 5' 3\" (1.6 m)     Wt Readings from Last 3 Encounters:   09/17/19 118 lb (53.5 kg)   05/07/19 115 lb 6.4 oz (52.3 kg)   05/06/19 110 lb (49.9 kg)     BP Readings from Last 3 Encounters:   09/17/19 122/66   05/07/19 114/60   05/06/19 (!) 121/58     Body mass index is 20.9 kg/m². Constitutional: Patient appears well-developed and well-nourished. No distress. Head: Normocephalic and atraumatic. Neck: Normal range of motion.  Neck

## 2019-10-02 ENCOUNTER — OFFICE VISIT (OUTPATIENT)
Dept: INTERNAL MEDICINE CLINIC | Age: 79
End: 2019-10-02
Payer: MEDICARE

## 2019-10-02 VITALS
HEART RATE: 74 BPM | DIASTOLIC BLOOD PRESSURE: 66 MMHG | WEIGHT: 119 LBS | OXYGEN SATURATION: 99 % | HEIGHT: 63 IN | SYSTOLIC BLOOD PRESSURE: 110 MMHG | BODY MASS INDEX: 21.09 KG/M2

## 2019-10-02 DIAGNOSIS — Z79.899 CONTROLLED SUBSTANCE AGREEMENT SIGNED: ICD-10-CM

## 2019-10-02 DIAGNOSIS — Z98.890 DIARRHEA FOLLOWING GASTROINTESTINAL SURGERY: ICD-10-CM

## 2019-10-02 DIAGNOSIS — M81.0 AGE-RELATED OSTEOPOROSIS WITHOUT CURRENT PATHOLOGICAL FRACTURE: ICD-10-CM

## 2019-10-02 DIAGNOSIS — R19.7 DIARRHEA FOLLOWING GASTROINTESTINAL SURGERY: ICD-10-CM

## 2019-10-02 DIAGNOSIS — Z23 NEED FOR INFLUENZA VACCINATION: ICD-10-CM

## 2019-10-02 DIAGNOSIS — F51.01 PRIMARY INSOMNIA: Primary | ICD-10-CM

## 2019-10-02 PROCEDURE — 90653 IIV ADJUVANT VACCINE IM: CPT | Performed by: INTERNAL MEDICINE

## 2019-10-02 PROCEDURE — 1036F TOBACCO NON-USER: CPT | Performed by: INTERNAL MEDICINE

## 2019-10-02 PROCEDURE — 1090F PRES/ABSN URINE INCON ASSESS: CPT | Performed by: INTERNAL MEDICINE

## 2019-10-02 PROCEDURE — G8427 DOCREV CUR MEDS BY ELIG CLIN: HCPCS | Performed by: INTERNAL MEDICINE

## 2019-10-02 PROCEDURE — 4040F PNEUMOC VAC/ADMIN/RCVD: CPT | Performed by: INTERNAL MEDICINE

## 2019-10-02 PROCEDURE — G8399 PT W/DXA RESULTS DOCUMENT: HCPCS | Performed by: INTERNAL MEDICINE

## 2019-10-02 PROCEDURE — 1123F ACP DISCUSS/DSCN MKR DOCD: CPT | Performed by: INTERNAL MEDICINE

## 2019-10-02 PROCEDURE — 99214 OFFICE O/P EST MOD 30 MIN: CPT | Performed by: INTERNAL MEDICINE

## 2019-10-02 PROCEDURE — G0008 ADMIN INFLUENZA VIRUS VAC: HCPCS | Performed by: INTERNAL MEDICINE

## 2019-10-02 PROCEDURE — G8420 CALC BMI NORM PARAMETERS: HCPCS | Performed by: INTERNAL MEDICINE

## 2019-10-02 PROCEDURE — G8482 FLU IMMUNIZE ORDER/ADMIN: HCPCS | Performed by: INTERNAL MEDICINE

## 2019-10-02 RX ORDER — ZOLPIDEM TARTRATE 10 MG/1
10 TABLET ORAL NIGHTLY PRN
Qty: 90 TABLET | Refills: 0 | Status: SHIPPED | OUTPATIENT
Start: 2019-10-02 | End: 2020-05-07 | Stop reason: SDUPTHER

## 2020-05-07 ENCOUNTER — TELEMEDICINE (OUTPATIENT)
Dept: INTERNAL MEDICINE CLINIC | Age: 80
End: 2020-05-07
Payer: MEDICARE

## 2020-05-07 VITALS
DIASTOLIC BLOOD PRESSURE: 61 MMHG | SYSTOLIC BLOOD PRESSURE: 129 MMHG | BODY MASS INDEX: 19.97 KG/M2 | HEIGHT: 64 IN | WEIGHT: 117 LBS

## 2020-05-07 PROCEDURE — 1036F TOBACCO NON-USER: CPT | Performed by: INTERNAL MEDICINE

## 2020-05-07 PROCEDURE — G8427 DOCREV CUR MEDS BY ELIG CLIN: HCPCS | Performed by: INTERNAL MEDICINE

## 2020-05-07 PROCEDURE — G8399 PT W/DXA RESULTS DOCUMENT: HCPCS | Performed by: INTERNAL MEDICINE

## 2020-05-07 PROCEDURE — G0439 PPPS, SUBSEQ VISIT: HCPCS | Performed by: INTERNAL MEDICINE

## 2020-05-07 PROCEDURE — 99214 OFFICE O/P EST MOD 30 MIN: CPT | Performed by: INTERNAL MEDICINE

## 2020-05-07 PROCEDURE — G8420 CALC BMI NORM PARAMETERS: HCPCS | Performed by: INTERNAL MEDICINE

## 2020-05-07 PROCEDURE — 4040F PNEUMOC VAC/ADMIN/RCVD: CPT | Performed by: INTERNAL MEDICINE

## 2020-05-07 PROCEDURE — 1090F PRES/ABSN URINE INCON ASSESS: CPT | Performed by: INTERNAL MEDICINE

## 2020-05-07 PROCEDURE — 1123F ACP DISCUSS/DSCN MKR DOCD: CPT | Performed by: INTERNAL MEDICINE

## 2020-05-07 RX ORDER — MONTELUKAST SODIUM 4 MG/1
TABLET, CHEWABLE ORAL
Qty: 180 TABLET | Refills: 3 | Status: SHIPPED | OUTPATIENT
Start: 2020-05-07

## 2020-05-07 RX ORDER — ZOLPIDEM TARTRATE 10 MG/1
10 TABLET ORAL NIGHTLY PRN
Qty: 90 TABLET | Refills: 0 | Status: SHIPPED | OUTPATIENT
Start: 2020-05-07 | End: 2020-10-01 | Stop reason: SDUPTHER

## 2020-05-07 ASSESSMENT — PATIENT HEALTH QUESTIONNAIRE - PHQ9
SUM OF ALL RESPONSES TO PHQ QUESTIONS 1-9: 0
SUM OF ALL RESPONSES TO PHQ QUESTIONS 1-9: 0

## 2020-05-07 ASSESSMENT — LIFESTYLE VARIABLES
HOW OFTEN DURING THE LAST YEAR HAVE YOU FOUND THAT YOU WERE NOT ABLE TO STOP DRINKING ONCE YOU HAD STARTED: 0
HOW OFTEN DO YOU HAVE SIX OR MORE DRINKS ON ONE OCCASION: 0
HAS A RELATIVE, FRIEND, DOCTOR, OR ANOTHER HEALTH PROFESSIONAL EXPRESSED CONCERN ABOUT YOUR DRINKING OR SUGGESTED YOU CUT DOWN: 0
HOW OFTEN DURING THE LAST YEAR HAVE YOU FAILED TO DO WHAT WAS NORMALLY EXPECTED FROM YOU BECAUSE OF DRINKING: 0
HOW OFTEN DURING THE LAST YEAR HAVE YOU NEEDED AN ALCOHOLIC DRINK FIRST THING IN THE MORNING TO GET YOURSELF GOING AFTER A NIGHT OF HEAVY DRINKING: 0
HOW OFTEN DURING THE LAST YEAR HAVE YOU BEEN UNABLE TO REMEMBER WHAT HAPPENED THE NIGHT BEFORE BECAUSE YOU HAD BEEN DRINKING: 0
HAVE YOU OR SOMEONE ELSE BEEN INJURED AS A RESULT OF YOUR DRINKING: 0
HOW MANY STANDARD DRINKS CONTAINING ALCOHOL DO YOU HAVE ON A TYPICAL DAY: 0
HOW OFTEN DO YOU HAVE A DRINK CONTAINING ALCOHOL: 4
HOW OFTEN DURING THE LAST YEAR HAVE YOU HAD A FEELING OF GUILT OR REMORSE AFTER DRINKING: 0
AUDIT-C TOTAL SCORE: 4
AUDIT TOTAL SCORE: 4

## 2020-05-07 NOTE — PROGRESS NOTES
Medicare Annual Wellness Visit  Name: Emerson Nino Date: 2020   MRN: <[id number]> Sex: Female   Age: 70 y.o. Ethnicity: Non-/Non    : 1940 Race: Kishore Chapman is here for Medicare AWV and Insomnia    Screenings for behavioral, psychosocial and functional/safety risks, and cognitive dysfunction are all negative except as indicated below. These results, as well as other patient data from the [address] [facility] form, are documented in Flowsheets linked to this Encounter. Allergies   Allergen Reactions    Penicillins Rash       Prior to Visit Medications    Medication Sig Taking? Authorizing Provider   colestipol (COLESTID) 1 g tablet TAKE 1 TABLET BY MOUTH TWICE A DAY Yes Opal Olivia MD   Probiotic Product (PROBIOTIC-10 PO) Take by mouth Yes Historical Provider, MD   calcium carbonate (OSCAL) 500 MG TABS tablet Take 600 mg by mouth daily Yes Historical Provider, MD   vitamin D (CHOLECALCIFEROL) 1000 UNIT TABS tablet Take 1,000 Units by mouth daily Yes Historical Provider, MD   denosumab (PROLIA) 60 MG/ML SOLN SC injection Inject 60 mg into the skin Every 6 months Yes Historical Provider, MD   loperamide (IMODIUM) 2 MG capsule Take 2 mg by mouth 4 times daily as needed for Diarrhea Yes Historical Provider, MD   PREMARIN vaginal cream  Yes Historical Provider, MD       History reviewed. No pertinent past medical history.     Past Surgical History:   Procedure Laterality Date    BUNIONECTOMY Bilateral     CARPAL TUNNEL RELEASE Right     CATARACT REMOVAL Left     CHOLECYSTECTOMY  11    Laparoscopic cholesystectomy with Alan Thakkar MD-[place]yn[place]    COLONOSCOPY      normal; Augusto Oliveira MD    COLONOSCOPY N/A 2018    COLONOSCOPY WITH BIOPSY performed by Lurdes Miller MD at [place] [address]      Macular pucker    TONSILLECTOMY      TUBAL LIGATION      UPPER GASTROINTESTINAL ENDOSCOPY Body mass index is 20.08 kg/m². Constitutional: Patient appears well-developed and well-nourished. No distress. Head: Normocephalic and atraumatic. Skin: No rash or erythema. Psychiatric: Normal mood and affect. Behavior is normal.       Assessment/Plan:  Caio Scales was seen today for medicare awv and insomnia. Diagnoses and all orders for this visit:    Routine general medical examination at a health care facility    Primary insomnia  -     zolpidem (AMBIEN) 10 MG tablet; Take 1 tablet by mouth nightly as needed for Sleep for up to 90 days. Controlled substance agreement signed  -     zolpidem (AMBIEN) 10 MG tablet; Take 1 tablet by mouth nightly as needed for Sleep for up to 90 days. Diarrhea following gastrointestinal surgery  -     colestipol (COLESTID) 1 g tablet; TAKE 1 TABLET BY MOUTH TWICE A DAY      Controlled Substance Monitoring:    Acute and Chronic Pain Monitoring:   RX Monitoring 5/7/2020   Attestation -   Periodic Controlled Substance Monitoring Possible medication side effects, risk of tolerance/dependence & alternative treatments discussed. ;No signs of potential drug abuse or diversion identified. Return Fasting 6 month on cholesterol and ambien.

## 2020-07-02 ENCOUNTER — HOSPITAL ENCOUNTER (OUTPATIENT)
Dept: MAMMOGRAPHY | Age: 80
Discharge: HOME | End: 2020-07-02
Payer: MEDICARE

## 2020-07-02 PROCEDURE — 77063 BREAST TOMOSYNTHESIS BI: CPT

## 2020-10-01 ENCOUNTER — OFFICE VISIT (OUTPATIENT)
Dept: INTERNAL MEDICINE CLINIC | Age: 80
End: 2020-10-01
Payer: MEDICARE

## 2020-10-01 VITALS
WEIGHT: 120 LBS | HEIGHT: 64 IN | SYSTOLIC BLOOD PRESSURE: 112 MMHG | TEMPERATURE: 98.2 F | BODY MASS INDEX: 20.49 KG/M2 | DIASTOLIC BLOOD PRESSURE: 60 MMHG | OXYGEN SATURATION: 99 % | HEART RATE: 70 BPM

## 2020-10-01 LAB
A/G RATIO: 1.8 (ref 1.1–2.2)
ALBUMIN SERPL-MCNC: 4.1 G/DL (ref 3.4–5)
ALP BLD-CCNC: 61 U/L (ref 40–129)
ALT SERPL-CCNC: 14 U/L (ref 10–40)
ANION GAP SERPL CALCULATED.3IONS-SCNC: 10 MMOL/L (ref 3–16)
AST SERPL-CCNC: 19 U/L (ref 15–37)
BASOPHILS ABSOLUTE: 0.1 K/UL (ref 0–0.2)
BASOPHILS RELATIVE PERCENT: 1 %
BILIRUB SERPL-MCNC: 0.8 MG/DL (ref 0–1)
BUN BLDV-MCNC: 16 MG/DL (ref 7–20)
CALCIUM SERPL-MCNC: 9.2 MG/DL (ref 8.3–10.6)
CHLORIDE BLD-SCNC: 104 MMOL/L (ref 99–110)
CHOLESTEROL, TOTAL: 190 MG/DL (ref 0–199)
CO2: 26 MMOL/L (ref 21–32)
CREAT SERPL-MCNC: 0.7 MG/DL (ref 0.6–1.2)
EOSINOPHILS ABSOLUTE: 0.2 K/UL (ref 0–0.6)
EOSINOPHILS RELATIVE PERCENT: 2.6 %
GFR AFRICAN AMERICAN: >60
GFR NON-AFRICAN AMERICAN: >60
GLOBULIN: 2.3 G/DL
GLUCOSE BLD-MCNC: 98 MG/DL (ref 70–99)
HCT VFR BLD CALC: 43.7 % (ref 36–48)
HDLC SERPL-MCNC: 87 MG/DL (ref 40–60)
HEMOGLOBIN: 14.3 G/DL (ref 12–16)
LDL CHOLESTEROL CALCULATED: 88 MG/DL
LYMPHOCYTES ABSOLUTE: 1.8 K/UL (ref 1–5.1)
LYMPHOCYTES RELATIVE PERCENT: 31.1 %
MCH RBC QN AUTO: 32 PG (ref 26–34)
MCHC RBC AUTO-ENTMCNC: 32.8 G/DL (ref 31–36)
MCV RBC AUTO: 97.6 FL (ref 80–100)
MONOCYTES ABSOLUTE: 0.5 K/UL (ref 0–1.3)
MONOCYTES RELATIVE PERCENT: 8.8 %
NEUTROPHILS ABSOLUTE: 3.3 K/UL (ref 1.7–7.7)
NEUTROPHILS RELATIVE PERCENT: 56.5 %
PDW BLD-RTO: 14.3 % (ref 12.4–15.4)
PLATELET # BLD: 239 K/UL (ref 135–450)
PMV BLD AUTO: 8.1 FL (ref 5–10.5)
POTASSIUM SERPL-SCNC: 5.4 MMOL/L (ref 3.5–5.1)
RBC # BLD: 4.48 M/UL (ref 4–5.2)
SODIUM BLD-SCNC: 140 MMOL/L (ref 136–145)
TOTAL PROTEIN: 6.4 G/DL (ref 6.4–8.2)
TRIGL SERPL-MCNC: 75 MG/DL (ref 0–150)
VLDLC SERPL CALC-MCNC: 15 MG/DL
WBC # BLD: 5.9 K/UL (ref 4–11)

## 2020-10-01 PROCEDURE — G8420 CALC BMI NORM PARAMETERS: HCPCS | Performed by: INTERNAL MEDICINE

## 2020-10-01 PROCEDURE — G8427 DOCREV CUR MEDS BY ELIG CLIN: HCPCS | Performed by: INTERNAL MEDICINE

## 2020-10-01 PROCEDURE — G8482 FLU IMMUNIZE ORDER/ADMIN: HCPCS | Performed by: INTERNAL MEDICINE

## 2020-10-01 PROCEDURE — 1036F TOBACCO NON-USER: CPT | Performed by: INTERNAL MEDICINE

## 2020-10-01 PROCEDURE — G8399 PT W/DXA RESULTS DOCUMENT: HCPCS | Performed by: INTERNAL MEDICINE

## 2020-10-01 PROCEDURE — 1090F PRES/ABSN URINE INCON ASSESS: CPT | Performed by: INTERNAL MEDICINE

## 2020-10-01 PROCEDURE — 99214 OFFICE O/P EST MOD 30 MIN: CPT | Performed by: INTERNAL MEDICINE

## 2020-10-01 PROCEDURE — 36415 COLL VENOUS BLD VENIPUNCTURE: CPT | Performed by: INTERNAL MEDICINE

## 2020-10-01 PROCEDURE — 4040F PNEUMOC VAC/ADMIN/RCVD: CPT | Performed by: INTERNAL MEDICINE

## 2020-10-01 PROCEDURE — 1123F ACP DISCUSS/DSCN MKR DOCD: CPT | Performed by: INTERNAL MEDICINE

## 2020-10-01 RX ORDER — ZOLPIDEM TARTRATE 10 MG/1
10 TABLET ORAL NIGHTLY PRN
Qty: 90 TABLET | Refills: 0 | Status: SHIPPED | OUTPATIENT
Start: 2020-10-01 | End: 2020-12-30

## 2020-10-01 NOTE — PROGRESS NOTES
Ezra Malhotra  YOB: 1940    Date of Service:  10/1/2020    Chief Complaint:      Chief Complaint   Patient presents with    Hyperlipidemia    Insomnia       HPI:  Ezra Malhotra is a 68 y.o. Primary Insomnia:  Stable on Ambien 10 mg 1/2 qhs and get 5 hrs vs waking up a lot when she doesn't use it.  Failed trazodone 2017 due to dizziness and fainted.    Hyperlipidemia:  Patient is  following a low fat, low cholesterol diet.  She is  exercising regularly. OTC Supplements: none.   Osteoporosis:  Stable on Prolia q 6 month per Dr. Jonna Pichardo  Diarrhea s/p choley:  improved to once every 2 weeks on Colestid 1 gm qd-bid and otc probiotic.      No results found for: Neil Arellano  Lab Results   Component Value Date     05/07/2019    K 5.4 (H) 05/07/2019     05/07/2019    CO2 27 05/07/2019    BUN 15 05/07/2019    CREATININE 0.6 05/07/2019    GLUCOSE 94 05/07/2019    CALCIUM 10.0 05/07/2019     Lab Results   Component Value Date    CHOL 215 05/07/2019    TRIG 85 05/07/2019     05/07/2019    LDLCALC 90 05/07/2019     Lab Results   Component Value Date    ALT 13 05/07/2019    AST 18 05/07/2019     Lab Results   Component Value Date    TSH 1.45 05/19/2017    TSH 0.91 09/26/2016     Lab Results   Component Value Date    WBC 7.1 05/07/2019    HGB 15.9 05/07/2019    HCT 46.2 05/07/2019    MCV 98.5 05/07/2019     05/07/2019     No results found for: INR   No results found for: PSA   No results found for: [address]     Patient Active Problem List   Diagnosis    Primary insomnia    Age-related osteoporosis without current pathological fracture    Diarrhea following gastrointestinal surgery    Diarrhea    Irritable bowel syndrome with diarrhea    Nausea    Change in bowel habit    Polyp of cecum    Loss of appetite    Controlled substance agreement signed    Risk for coronary artery disease between 10% and 20% in next 10 years       Allergies   Allergen Reactions    insomnia. Diagnoses and all orders for this visit:    Primary insomnia  -     zolpidem (AMBIEN) 10 MG tablet; Take 1 tablet by mouth nightly as needed for Sleep for up to 90 days. Controlled substance agreement signed  -     zolpidem (AMBIEN) 10 MG tablet; Take 1 tablet by mouth nightly as needed for Sleep for up to 90 days. Diarrhea following gastrointestinal surgery    Irritable bowel syndrome with diarrhea    Age-related osteoporosis without current pathological fracture    Risk for coronary artery disease between 10% and 20% in next 10 years    Hypercholesteremia  -     Lipid Panel  -     Comprehensive Metabolic Panel  -     CBC Auto Differential        Return May 7 8:10 Medicare Wellness and f/u.

## 2021-07-07 ENCOUNTER — HOSPITAL ENCOUNTER (OUTPATIENT)
Dept: MAMMOGRAPHY | Age: 81
Discharge: HOME | End: 2021-07-07
Payer: MEDICARE

## 2021-07-07 VITALS — BODY MASS INDEX: 19.46 KG/M2 | HEIGHT: 64 IN | WEIGHT: 114 LBS

## 2021-07-07 DIAGNOSIS — Z12.31 VISIT FOR SCREENING MAMMOGRAM: ICD-10-CM

## 2021-07-07 PROCEDURE — 77063 BREAST TOMOSYNTHESIS BI: CPT

## 2022-06-15 ENCOUNTER — CLINICAL DOCUMENTATION (OUTPATIENT)
Dept: OTHER | Age: 82
End: 2022-06-15

## 2022-07-11 ENCOUNTER — HOSPITAL ENCOUNTER (OUTPATIENT)
Dept: MAMMOGRAPHY | Age: 82
Discharge: HOME | End: 2022-07-11
Payer: MEDICARE

## 2022-07-11 VITALS — HEIGHT: 63 IN | WEIGHT: 117 LBS | BODY MASS INDEX: 20.73 KG/M2

## 2022-07-11 DIAGNOSIS — Z12.31 VISIT FOR SCREENING MAMMOGRAM: ICD-10-CM

## 2022-07-11 PROCEDURE — 77063 BREAST TOMOSYNTHESIS BI: CPT

## 2023-07-12 ENCOUNTER — HOSPITAL ENCOUNTER (OUTPATIENT)
Dept: MAMMOGRAPHY | Age: 83
Discharge: HOME | End: 2023-07-12
Payer: MEDICARE

## 2023-07-12 VITALS — HEIGHT: 63 IN | WEIGHT: 117 LBS | BODY MASS INDEX: 20.73 KG/M2

## 2023-07-12 DIAGNOSIS — Z12.31 VISIT FOR SCREENING MAMMOGRAM: ICD-10-CM

## 2023-07-12 PROCEDURE — 77063 BREAST TOMOSYNTHESIS BI: CPT

## 2024-07-12 ENCOUNTER — HOSPITAL ENCOUNTER (OUTPATIENT)
Dept: MAMMOGRAPHY | Age: 84
Discharge: HOME | End: 2024-07-12
Payer: MEDICARE

## 2024-07-12 VITALS — WEIGHT: 117 LBS | BODY MASS INDEX: 20.73 KG/M2 | HEIGHT: 63 IN

## 2024-07-12 DIAGNOSIS — Z12.31 VISIT FOR SCREENING MAMMOGRAM: ICD-10-CM

## 2024-07-12 PROCEDURE — 77063 BREAST TOMOSYNTHESIS BI: CPT

## 2025-07-14 ENCOUNTER — HOSPITAL ENCOUNTER (OUTPATIENT)
Dept: MAMMOGRAPHY | Age: 85
Discharge: HOME | End: 2025-07-14
Payer: MEDICARE

## 2025-07-14 VITALS — BODY MASS INDEX: 21.09 KG/M2 | WEIGHT: 119 LBS | HEIGHT: 63 IN

## 2025-07-14 DIAGNOSIS — Z12.31 VISIT FOR SCREENING MAMMOGRAM: ICD-10-CM

## 2025-07-14 PROCEDURE — 77063 BREAST TOMOSYNTHESIS BI: CPT

## (undated) DEVICE — CONMED SCOPE SAVER BITE BLOCK, 20X27 MM: Brand: SCOPE SAVER

## (undated) DEVICE — ENDO CARRY-ON PROCEDURE KIT INCLUDES SUCTION TUBING, LUBRICANT, GAUZE, BIOHAZARD STICKER, TRANSPORT PAD AND INTERCEPT BEDSIDE KIT.: Brand: ENDO CARRY-ON PROCEDURE KIT

## (undated) DEVICE — ELECTRODE ECG MONITR FOAM TEAR DROP ADLT RED

## (undated) DEVICE — FORCEPS BX L240CM DIA2.4MM L NDL RAD JAW 4 133340

## (undated) DEVICE — AIRLIFE™ NASAL OXYGEN CANNULA CURVED, FLARED TIP, WITH 7 FEET (2.1 M) CRUSH RESISTANT TUBING, OVER-THE-EAR STYLE: Brand: AIRLIFE™